# Patient Record
Sex: MALE | Race: WHITE | NOT HISPANIC OR LATINO | URBAN - METROPOLITAN AREA
[De-identification: names, ages, dates, MRNs, and addresses within clinical notes are randomized per-mention and may not be internally consistent; named-entity substitution may affect disease eponyms.]

---

## 2019-05-09 ENCOUNTER — EMERGENCY (EMERGENCY)
Facility: HOSPITAL | Age: 64
LOS: 1 days | Discharge: HOME | End: 2019-05-09
Attending: EMERGENCY MEDICINE | Admitting: EMERGENCY MEDICINE
Payer: COMMERCIAL

## 2019-05-09 VITALS
OXYGEN SATURATION: 96 % | HEIGHT: 73 IN | TEMPERATURE: 98 F | HEART RATE: 84 BPM | WEIGHT: 184.97 LBS | SYSTOLIC BLOOD PRESSURE: 119 MMHG | DIASTOLIC BLOOD PRESSURE: 76 MMHG | RESPIRATION RATE: 16 BRPM

## 2019-05-09 VITALS
DIASTOLIC BLOOD PRESSURE: 86 MMHG | RESPIRATION RATE: 18 BRPM | HEART RATE: 99 BPM | TEMPERATURE: 98 F | SYSTOLIC BLOOD PRESSURE: 156 MMHG | OXYGEN SATURATION: 95 %

## 2019-05-09 DIAGNOSIS — Z98.89 OTHER SPECIFIED POSTPROCEDURAL STATES: Chronic | ICD-10-CM

## 2019-05-09 DIAGNOSIS — F41.1 GENERALIZED ANXIETY DISORDER: ICD-10-CM

## 2019-05-09 LAB
ALBUMIN SERPL ELPH-MCNC: 3.7 G/DL — SIGNIFICANT CHANGE UP (ref 3.3–5)
ALP SERPL-CCNC: 110 U/L — SIGNIFICANT CHANGE UP (ref 30–120)
ALT FLD-CCNC: 48 U/L DA — SIGNIFICANT CHANGE UP (ref 10–60)
AMPHET UR-MCNC: NEGATIVE — SIGNIFICANT CHANGE UP
ANION GAP SERPL CALC-SCNC: 13 MMOL/L — SIGNIFICANT CHANGE UP (ref 5–17)
APAP SERPL-MCNC: <1 UG/ML — LOW (ref 10–30)
APPEARANCE UR: CLEAR — SIGNIFICANT CHANGE UP
AST SERPL-CCNC: 54 U/L — HIGH (ref 10–40)
BARBITURATES UR SCN-MCNC: NEGATIVE — SIGNIFICANT CHANGE UP
BASOPHILS # BLD AUTO: 0.06 K/UL — SIGNIFICANT CHANGE UP (ref 0–0.2)
BASOPHILS NFR BLD AUTO: 0.6 % — SIGNIFICANT CHANGE UP (ref 0–2)
BENZODIAZ UR-MCNC: POSITIVE
BILIRUB DIRECT SERPL-MCNC: 0.1 MG/DL — SIGNIFICANT CHANGE UP (ref 0–0.2)
BILIRUB INDIRECT FLD-MCNC: 0.4 MG/DL — SIGNIFICANT CHANGE UP (ref 0.2–1)
BILIRUB SERPL-MCNC: 0.5 MG/DL — SIGNIFICANT CHANGE UP (ref 0.2–1.2)
BILIRUB UR-MCNC: NEGATIVE — SIGNIFICANT CHANGE UP
BUN SERPL-MCNC: 14 MG/DL — SIGNIFICANT CHANGE UP (ref 7–23)
CALCIUM SERPL-MCNC: 8.3 MG/DL — LOW (ref 8.4–10.5)
CHLORIDE SERPL-SCNC: 105 MMOL/L — SIGNIFICANT CHANGE UP (ref 96–108)
CO2 SERPL-SCNC: 25 MMOL/L — SIGNIFICANT CHANGE UP (ref 22–31)
COCAINE METAB.OTHER UR-MCNC: POSITIVE
COLOR SPEC: YELLOW — SIGNIFICANT CHANGE UP
CREAT SERPL-MCNC: 0.96 MG/DL — SIGNIFICANT CHANGE UP (ref 0.5–1.3)
DIFF PNL FLD: NEGATIVE — SIGNIFICANT CHANGE UP
EOSINOPHIL # BLD AUTO: 0.17 K/UL — SIGNIFICANT CHANGE UP (ref 0–0.5)
EOSINOPHIL NFR BLD AUTO: 1.7 % — SIGNIFICANT CHANGE UP (ref 0–6)
ETHANOL SERPL-MCNC: 240 MG/DL — HIGH (ref 0–3)
ETHANOL SERPL-MCNC: 96 MG/DL — HIGH (ref 0–3)
GLUCOSE SERPL-MCNC: 66 MG/DL — LOW (ref 70–99)
GLUCOSE UR QL: NEGATIVE MG/DL — SIGNIFICANT CHANGE UP
HCT VFR BLD CALC: 46.6 % — SIGNIFICANT CHANGE UP (ref 39–50)
HGB BLD-MCNC: 16.8 G/DL — SIGNIFICANT CHANGE UP (ref 13–17)
IMM GRANULOCYTES NFR BLD AUTO: 0.4 % — SIGNIFICANT CHANGE UP (ref 0–1.5)
KETONES UR-MCNC: NEGATIVE — SIGNIFICANT CHANGE UP
LEUKOCYTE ESTERASE UR-ACNC: NEGATIVE — SIGNIFICANT CHANGE UP
LYMPHOCYTES # BLD AUTO: 3.39 K/UL — HIGH (ref 1–3.3)
LYMPHOCYTES # BLD AUTO: 34.9 % — SIGNIFICANT CHANGE UP (ref 13–44)
MCHC RBC-ENTMCNC: 31.3 PG — SIGNIFICANT CHANGE UP (ref 27–34)
MCHC RBC-ENTMCNC: 36.1 GM/DL — HIGH (ref 32–36)
MCV RBC AUTO: 86.9 FL — SIGNIFICANT CHANGE UP (ref 80–100)
METHADONE UR-MCNC: NEGATIVE — SIGNIFICANT CHANGE UP
MONOCYTES # BLD AUTO: 0.56 K/UL — SIGNIFICANT CHANGE UP (ref 0–0.9)
MONOCYTES NFR BLD AUTO: 5.8 % — SIGNIFICANT CHANGE UP (ref 2–14)
NEUTROPHILS # BLD AUTO: 5.5 K/UL — SIGNIFICANT CHANGE UP (ref 1.8–7.4)
NEUTROPHILS NFR BLD AUTO: 56.6 % — SIGNIFICANT CHANGE UP (ref 43–77)
NITRITE UR-MCNC: NEGATIVE — SIGNIFICANT CHANGE UP
NRBC # BLD: 0 /100 WBCS — SIGNIFICANT CHANGE UP (ref 0–0)
OPIATES UR-MCNC: NEGATIVE — SIGNIFICANT CHANGE UP
PCP SPEC-MCNC: SIGNIFICANT CHANGE UP
PCP UR-MCNC: NEGATIVE — SIGNIFICANT CHANGE UP
PH UR: 5 — SIGNIFICANT CHANGE UP (ref 5–8)
PLATELET # BLD AUTO: 329 K/UL — SIGNIFICANT CHANGE UP (ref 150–400)
POTASSIUM SERPL-MCNC: 3.8 MMOL/L — SIGNIFICANT CHANGE UP (ref 3.5–5.3)
POTASSIUM SERPL-SCNC: 3.8 MMOL/L — SIGNIFICANT CHANGE UP (ref 3.5–5.3)
PROT SERPL-MCNC: 7.4 G/DL — SIGNIFICANT CHANGE UP (ref 6–8.3)
PROT UR-MCNC: NEGATIVE MG/DL — SIGNIFICANT CHANGE UP
RBC # BLD: 5.36 M/UL — SIGNIFICANT CHANGE UP (ref 4.2–5.8)
RBC # FLD: 11.9 % — SIGNIFICANT CHANGE UP (ref 10.3–14.5)
SALICYLATES SERPL-MCNC: 1.1 MG/DL — LOW (ref 2.8–20)
SODIUM SERPL-SCNC: 143 MMOL/L — SIGNIFICANT CHANGE UP (ref 135–145)
SP GR SPEC: 1.01 — SIGNIFICANT CHANGE UP (ref 1.01–1.02)
THC UR QL: NEGATIVE — SIGNIFICANT CHANGE UP
UROBILINOGEN FLD QL: NEGATIVE MG/DL — SIGNIFICANT CHANGE UP
WBC # BLD: 9.72 K/UL — SIGNIFICANT CHANGE UP (ref 3.8–10.5)
WBC # FLD AUTO: 9.72 K/UL — SIGNIFICANT CHANGE UP (ref 3.8–10.5)

## 2019-05-09 PROCEDURE — 99285 EMERGENCY DEPT VISIT HI MDM: CPT | Mod: 25

## 2019-05-09 PROCEDURE — 90792 PSYCH DIAG EVAL W/MED SRVCS: CPT | Mod: GT

## 2019-05-09 PROCEDURE — 85027 COMPLETE CBC AUTOMATED: CPT

## 2019-05-09 PROCEDURE — 80053 COMPREHEN METABOLIC PANEL: CPT

## 2019-05-09 PROCEDURE — 93010 ELECTROCARDIOGRAM REPORT: CPT

## 2019-05-09 PROCEDURE — 36415 COLL VENOUS BLD VENIPUNCTURE: CPT

## 2019-05-09 PROCEDURE — 99285 EMERGENCY DEPT VISIT HI MDM: CPT

## 2019-05-09 PROCEDURE — 82248 BILIRUBIN DIRECT: CPT

## 2019-05-09 PROCEDURE — 93005 ELECTROCARDIOGRAM TRACING: CPT

## 2019-05-09 PROCEDURE — 81003 URINALYSIS AUTO W/O SCOPE: CPT

## 2019-05-09 PROCEDURE — 80307 DRUG TEST PRSMV CHEM ANLYZR: CPT

## 2019-05-09 NOTE — ED PROVIDER NOTE - OBJECTIVE STATEMENT
65 y/o male with a PMHx of depression, EtOH and drug abuse, anxiety, HTN, suicidal behavior presents to the ED for intoxication and SI. Pt states he took a lot of xanax, clonazepam, vodka because his daughter is sick. Denies SI. Per wife, pt has been taking xanax and clonazepam. Has been to [place] in [place] for anxiety. No prior hospitalization for SI.

## 2019-05-09 NOTE — ED BEHAVIORAL HEALTH ASSESSMENT NOTE - DESCRIPTION (FIRST USE, LAST USE, QUANTITY, FREQUENCY, DURATION)
has occasional binges, up to several days at a time, when anxiety is bad; prior to today, last episode ~sivakumar used 2 weeks ago at a wedding hx of overusing his benzodiazepines (Klonopin, Xanax)

## 2019-05-09 NOTE — ED BEHAVIORAL HEALTH ASSESSMENT NOTE - OTHER PAST PSYCHIATRIC HISTORY (INCLUDE DETAILS REGARDING ONSET, COURSE OF ILLNESS, INPATIENT/OUTPATIENT TREATMENT)
currently seeing psychiatrist in [place] ("Dr. CALVERT") at [facility] ([phone]) who he sees once a month; hx of multiple psychiatric hospitalizations ([facility] - 2008, [facility], [facility] x 2, [facility] x 2, [facility]), most recently ~1 1/2 yrs ago at [facility]; hospitalizations for overdoses related to social anxiety/panic attacks; says he has never been medically hospitalized for these overdoses; no self-harming behaviors

## 2019-05-09 NOTE — ED BEHAVIORAL HEALTH ASSESSMENT NOTE - SUICIDE PROTECTIVE FACTORS
Identifies reasons for living/Future oriented/Engaged in work or school/Positive therapeutic relationships/Supportive social network or family/Responsibility to family and others

## 2019-05-09 NOTE — ED BEHAVIORAL HEALTH ASSESSMENT NOTE - RISK ASSESSMENT
Patient currently has a moderate to high chronic risk of harm to self.  His chronic risk factors include history of self-harm, suicide attempt (somewhat abated by fact that he was intoxicated and denies clear intent of ending life in the past), psychiatric hospitalization, psychiatric treatment, substance use, male gender, financial struggles, social isolation, and chronic medical diagnoses.. His potential acute risk factors include substance abuse and anxiety.  His protective factors include strong family support, current willingness to engage in treatment, participation in safety planning, future orientation, and current denial of any thoughts of self-harm and/or suicide.

## 2019-05-09 NOTE — ED ADULT NURSE NOTE - ED STAT RN HANDOFF DETAILS
Patient on constant observation. speaking with tele psych for second time at this time. IV SL patent. Pending disposition. Cooperative. endorsed to Estrellita Capellan RN.

## 2019-05-09 NOTE — ED BEHAVIORAL HEALTH ASSESSMENT NOTE - HPI (INCLUDE ILLNESS QUALITY, SEVERITY, DURATION, TIMING, CONTEXT, MODIFYING FACTORS, ASSOCIATED SIGNS AND SYMPTOMS)
Patient is a ____ year-old ____ male/female, currently living in ____ with his/her _____, enrolled in ____ school, in the ____ grade regular/special education, with prior psychiatric history of insert mentioned diagnoses, currently in/not in outpatient treatment, with/without history of insert number of psychiatric hospitalization(s), with/without history of self-injury or suicide attempts, with past medical history of ____, with/without history of aggression, violence or legal troubles, now presenting accompanied by ¬¬¬____ due to ____.    As per patient, he says that yesterday morning, his daughter was repeatedly texting them that she was not feeling well. He describes himself as a control freak, and he began to freak out, experiencing an intense panic attack, characterized by palpitations, racing thoughts, and feeling like he is in another place, "like I am going to go crazy."  He also finds that the morning is a tougher thing as he thinks about his day ahead of him. He took his Klonopin 1 mg and tried to lay down, but he was not able to calm down. He proceeded to drive to the liquor store, thinking he will have a drink and feel better, but he purchased a pint of vodka. He went home, drank the vodka, and his son saw that he was intoxicated.  He got in the car and drove down the block, falling asleep in the car. His daughter was able to see that he had driven down the street, and his brother came to the home. He knows that he has a history of taking additional Klonopin once he is drunk.    His wife then brought him to the hospital. He says that this was because they wanted him to stop using substances.  Says that he and his wife decided that he was going to go back to [place] for Dual Diagnosis Treatment.  He says that aside from this episode, he has been struggling, finding that he has good and bad days, but he is always in a state of tension. He notes that he is better now than he used to, describing how he always had a fear of something bad happening to others or to himself. This would cause him to be unable to be in social situations or public places of any kind.  He describes his social anxiety relates to a challenge with small talk, feeling like he wont want to have ot think of what to say. He also has a history of panic attacks, but says that since taking the Klonopin, it has been "hardly ever." However, if something happens, he will get set off. Now, however, they are not spontaneous. Says that his mood has been generally okay, although he has been stressed about finances, noting that they are maxed out on credit cards, his son is leaving for college in September, supporting his daughter's life at college, having four cars, and owing $12,000 to the [employer] for their taxes.  He says that at times, he has thoughts like he would love to "end it all," although he denies ever having an active intent or plan to harm himself. Says that the suicidal comments he made are "just to annoy Liane," denying any thoughts of harming himself or killing himself in any way at present. He says that inpatient treatment had helped consolidate Patient is a ____ year-old ____ male/female, currently living in ____ with his/her _____, enrolled in ____ school, in the ____ grade regular/special education, with prior psychiatric history of insert mentioned diagnoses, currently in/not in outpatient treatment, with/without history of insert number of psychiatric hospitalization(s), with/without history of self-injury or suicide attempts, with past medical history of ____, with/without history of aggression, violence or legal troubles, now presenting accompanied by ¬¬¬____ due to ____.    As per patient, he says that yesterday morning, his daughter was repeatedly texting them that she was not feeling well. He describes himself as a control freak, and he began to freak out, experiencing an intense panic attack, characterized by palpitations, racing thoughts, and feeling like he is in another place, "like I am going to go crazy."  He also finds that the morning is a tougher thing as he thinks about his day ahead of him. He took his Klonopin 1 mg and tried to lay down, but he was not able to calm down. He proceeded to drive to the liquor store, thinking he will have a drink and feel better, but he purchased a pint of vodka. He went home, drank the vodka, and his son saw that he was intoxicated.  He got in the car and drove down the block, falling asleep in the car. His daughter was able to see that he had driven down the street, and his brother came to the home. He knows that he has a history of taking additional Klonopin once he is drunk.    His wife then brought him to the hospital. He says that this was because they wanted him to stop using substances.  Says that he and his wife decided that he was going to go back to [place] for Dual Diagnosis Treatment.  He says that aside from this episode, he has been struggling, finding that he has good and bad days, but he is always in a state of tension. He notes that he is better now than he used to, describing how he always had a fear of something bad happening to others or to himself. This would cause him to be unable to be in social situations or public places of any kind.  He describes his social anxiety relates to a challenge with small talk, feeling like he wont want to have ot think of what to say. He also has a history of panic attacks, but says that since taking the Klonopin, it has been "hardly ever." However, if something happens, he will get set off. Now, however, they are not spontaneous. Says that his mood has been generally okay, although he has been stressed about finances, noting that they are maxed out on credit cards, his son is leaving for college in September, supporting his daughter's life at college, having four cars, and owing $12,000 to the [employer] for their taxes.  He says that at times, he has thoughts like he would love to "end it all," although he denies ever having an active intent or plan to harm himself. Says that the suicidal comments he made are "just to annoy Liane," denying any thoughts of harming himself or killing himself in any way at present. He says that inpatient treatment had helped consolidate his skills, but he does not find that inpatient has been good enough as he has to leave within 1 week and also that the outpatient programs have not met his expectations. Patient is a  year-old ____ male/female, currently living in ____ with his/her _____, enrolled in ____ school, in the ____ grade regular/special education, with prior psychiatric history of insert mentioned diagnoses, currently in/not in outpatient treatment, with/without history of insert number of psychiatric hospitalization(s), with/without history of self-injury or suicide attempts, with past medical history of ____, with/without history of aggression, violence or legal troubles, now presenting accompanied by ¬¬¬____ due to ____.    As per patient, he says that yesterday morning, his daughter was repeatedly texting them that she was not feeling well. He describes himself as a control freak, and he began to freak out, experiencing an intense panic attack, characterized by palpitations, racing thoughts, and feeling like he is in another place, "like I am going to go crazy."  He also finds that the morning is a tougher thing as he thinks about his day ahead of him. He took his Klonopin 1 mg and tried to lay down, but he was not able to calm down. He proceeded to drive to the liquor store, thinking he will have a drink and feel better, but he purchased a pint of vodka. He went home, drank the vodka, and his son saw that he was intoxicated.  He got in the car and drove down the block, falling asleep in the car. His daughter was able to see that he had driven down the street, and his brother came to the home. He knows that he has a history of taking additional Klonopin once he is drunk.    His wife then brought him to the hospital. He says that this was because they wanted him to stop using substances.  Says that he and his wife decided that he was going to go back to [place] for Dual Diagnosis Treatment.  He says that aside from this episode, he has been struggling, finding that he has good and bad days, but he is always in a state of tension. He notes that he is better now than he used to, describing how he always had a fear of something bad happening to others or to himself. This would cause him to be unable to be in social situations or public places of any kind.  He describes his social anxiety relates to a challenge with small talk, feeling like he wont want to have ot think of what to say. He also has a history of panic attacks, but says that since taking the Klonopin, it has been "hardly ever." However, if something happens, he will get set off. Now, however, they are not spontaneous. Says that his mood has been generally okay, although he has been stressed about finances, noting that they are maxed out on credit cards, his son is leaving for college in September, supporting his daughter's life at college, having four cars, and owing $12,000 to the [employer] for their taxes.  He says that at times, he has thoughts like he would love to "end it all," although he denies ever having an active intent or plan to harm himself. Says that the suicidal comments he made are "just to annoy Liane," denying any thoughts of harming himself or killing himself in any way at present. He says that inpatient treatment had helped consolidate his skills, but he does not find that inpatient has been good enough as he has to leave within 1 week and also that the outpatient programs have not met his expectations. Patient is a 64 year-old  male, currently living in [place] with his wife and son, employed at a [employer] factory, with prior psychiatric history of Generalized Anxiety Disorder, Social Phobia and Panic Disorder, currently in outpatient psychiatric treatment at [facility] in [place], with history of multiple psychiatric hospitalizations, most recently 2 yrs ago, with treatment primarily for dual diagnosis given his history of significant ETOH Use Disorder and Benzodiazepine Use Disorder, without history of self-injury but with multiple prior overdoses which have included questionable suicidality as patient has made multiple suicidal statements over time, on one occasion slitting his wrist while intoxicated, with past medical history of HTN, HLD, and GERD, without history of aggression, violence or legal troubles, now presenting accompanied by his wife after an overdose on Klonopin, Xanax and ETOH in the context of worsening anxiety. However, they say that they are primarily here for "medical clearance for [facility] or [facility]."     As per patient, he says that yesterday morning, his daughter was repeatedly texting them that she was not feeling well. He describes himself as a control freak, and he began to freak out, experiencing an intense panic attack, characterized by palpitations, racing thoughts, and feeling like he is in another place, "like I am going to go crazy."  He also finds that the morning is a tougher thing as he thinks about his day ahead of him. He took his Klonopin 1 mg and tried to lay down, but he was not able to calm down. He proceeded to drive to the liquor store, thinking he will have a drink and feel better, but he purchased a pint of vodka. He went home, drank the vodka, and his son saw that he was intoxicated.  He got in the car and drove down the block, falling asleep in the car. His daughter was able to see that he had driven down the street, and his brother came to the home. He knows that he has a history of taking additional Klonopin once he is drunk.    His wife then brought him to the hospital. He says that this was because they wanted him to stop using substances.  Says that he and his wife decided that he was going to go back to [place] for Dual Diagnosis Treatment.  He says that aside from this episode, he has been struggling, finding that he has good and bad days, but he is always in a state of tension. He notes that he is better now than he used to, describing how he always had a fear of something bad happening to others or to himself. This would cause him to be unable to be in social situations or public places of any kind.  He describes his social anxiety relates to a challenge with small talk, feeling like he wont want to have ot think of what to say. He also has a history of panic attacks, but says that since taking the Klonopin, it has been "hardly ever." However, if something happens, he will get set off. Now, however, they are not spontaneous. Says that his mood has been generally okay, although he has been stressed about finances, noting that they are maxed out on credit cards, his son is leaving for college in September, supporting his daughter's life at college, having four cars, and owing $12,000 to the [employer] for their taxes.  He says that at times, he has thoughts like he would love to "end it all," although he denies ever having an active intent or plan to harm himself. Says that the suicidal comments he made are "just to annoy Liane," denying any thoughts of harming himself or killing himself in any way at present. He says that inpatient treatment had helped consolidate his skills, but he does not find that inpatient has been good enough as he has to leave within 1 week and also that the outpatient programs have not met his expectations. Patient is a 64 year-old  male, currently living in [place] with his wife and son, employed at a [email or web address] factory, with prior psychiatric history of Generalized Anxiety Disorder, Social Phobia and Panic Disorder, currently in outpatient psychiatric treatment at [facility] in [place], with history of multiple psychiatric hospitalizations, most recently 2 yrs ago, with treatment primarily for dual diagnosis given his history of significant ETOH Use Disorder and Benzodiazepine Use Disorder, without history of self-injury but with multiple prior overdoses which have included questionable suicidality as patient has made multiple suicidal statements over time, on one occasion slitting his wrist while intoxicated, with past medical history of HTN, HLD, and GERD, without history of aggression, violence or legal troubles, now presenting accompanied by his wife after an overdose on Klonopin, Xanax and ETOH in the context of worsening anxiety. However, they say that they are primarily here for "medical clearance for [facility] or [facility]."     As per patient, he says that yesterday morning, his daughter was repeatedly texting them that she was not feeling well. He describes himself as a control freak, and he began to freak out, experiencing an intense panic attack, characterized by palpitations, racing thoughts, and feeling like he is in another place, "like I am going to go crazy."  He also finds that the morning is a tougher thing as he thinks about his day ahead of him. He took his Klonopin 1 mg and tried to lay down, but he was not able to calm down. He proceeded to drive to the liquor store, thinking he will have a drink and feel better, but he purchased a pint of vodka. He went home, drank the vodka, and his son saw that he was intoxicated.  He got in the car and drove down the block, falling asleep in the car. His daughter was able to see that he had driven down the street, and his brother came to the home. He knows that he has a history of taking additional Klonopin once he is drunk.    His wife then brought him to the hospital. He says that this was because they wanted him to stop using substances.  Says that he and his wife decided that he was going to go back to [place] for Dual Diagnosis Treatment.  He says that aside from this episode, he has been struggling, finding that he has good and bad days, but he is always in a state of tension. He notes that he is better now than he used to, describing how he always had a fear of something bad happening to others or to himself. This would cause him to be unable to be in social situations or public places of any kind.  He describes his social anxiety relates to a challenge with small talk, feeling like he wont want to have ot think of what to say. He also has a history of panic attacks, but says that since taking the Klonopin, it has been "hardly ever." However, if something happens, he will get set off. Now, however, they are not spontaneous. Says that his mood has been generally okay, although he has been stressed about finances, noting that they are maxed out on credit cards, his son is leaving for college in September, supporting his daughter's life at college, having four cars, and owing $12,000 to the [employer] for their taxes.  He says that at times, he has thoughts like he would love to "end it all," although he denies ever having an active intent or plan to harm himself. Says that the suicidal comments he made are "just to annoy Liane," denying any thoughts of harming himself or killing himself in any way at present. He says that inpatient treatment had helped consolidate his skills, but he does not find that inpatient has been good enough as he has to leave within 1 week and also that the outpatient programs have not met his expectations.    SEE ADDENDUM FOR COLLATERAL

## 2019-05-09 NOTE — ED PROVIDER NOTE - NS_ ATTENDINGSCRIBEDETAILS _ED_A_ED_FT
Kaiden Henry MD - The scribe's documentation has been prepared under my direction and personally reviewed by me in its entirety. I confirm that the note above accurately reflects all work, treatment, procedures, and medical decision making performed by me.

## 2019-05-09 NOTE — ED ADULT NURSE NOTE - NS ED NURSE DC INFO COMPLEXITY
Verbalized Understanding/Complex: Multiple Rx/Tx. Pt has difficulty understanding. Requires additional help/Patient asked questions

## 2019-05-09 NOTE — ED BEHAVIORAL HEALTH ASSESSMENT NOTE - REFERRAL / APPOINTMENT DETAILS
patient is going to attend an inpatient dual diagnosis program, which his family will bring him to tomorrow; may also continue to work with outpatient team if that is what he chooses; discussed other options, which they declined

## 2019-05-09 NOTE — ED ADULT TRIAGE NOTE - CHIEF COMPLAINT QUOTE
Patient presents to ED obtunded, lethargic and responding to stimuli. Patient wife gave information, patient has been taking xanax and clonazepam in high quantty over the past 48hrs and drinking ETOH. patient reports SI with intent. Placed on constant observation. PMH anxiety and depression.

## 2019-05-09 NOTE — ED PROVIDER NOTE - PROGRESS NOTE DETAILS
As per wife, pt lied and had suicidal ideation. pt and wife aware , will wait for etoh level to decrease <100 prior to Telepsych eval about pt and wife aware , will wait for etoh level to decrease <100 prior to Telepsych eval about 2100 As per wife, pt lied and had suicidal ideations. pt admitted to "I don't want live like this," and suicidal ideations expressed multiple times to 1:1 aide, while pt's wife Liane was present.  Both patient and wife upset bc they want to go to [place] in [place] seen by telepsych, to f/u with detox program tomorrow with family, cleared

## 2019-05-09 NOTE — ED ADULT NURSE NOTE - OBJECTIVE STATEMENT
Patient brought in for medical clearance for admission to [facility] &/or [facility]. Patient took an unknown amount of klonopin and xanax and drank vodka today in an attempt to harm himself. Patient presents drowsy, arouses easily and is irritable. Patient states he learned his daughter is sick with a sore throat away at college so he became anxious and was trying to harm himself.

## 2019-05-09 NOTE — ED BEHAVIORAL HEALTH ASSESSMENT NOTE - CURRENT MEDICATION
hx of Generalized Anxiety Disorder, Panic Disorder, since 14 year old; Klonopin 0.5 mg (prescribed twice daily but takes 1 mg in AM), Xanax 0.5 mg as needed for anxiety/panic (says he takes his supply of 90 pills/60 days), Norvasc 10 mg daily, Nexium, Atorvastatin, Propecia (for hair growth)

## 2019-05-09 NOTE — ED BEHAVIORAL HEALTH ASSESSMENT NOTE - DETAILS
intoxicated paternal aunt - agoraphobia both parents - dementia 2 brothers - heroin addicts, now sober, sister - oxycontin, sister - ETOH when he first went to [place] - wife was away and daughter was at home 1 in college, 1 in high school see HPI wife

## 2019-05-09 NOTE — ED ADULT NURSE NOTE - HPI (INCLUDE ILLNESS QUALITY, SEVERITY, DURATION, TIMING, CONTEXT, MODIFYING FACTORS, ASSOCIATED SIGNS AND SYMPTOMS)
as previously stated the patient learned his daughter who is away at college was ill with a sore throat. He became anxious and took an unknown amount of klonopin and xanax and drank vodka. Family found patient drowsy, difficult to arouse and tried to get him admitted to either [facility] or [facility] where he has been admitted in the past but he needs medical clearance so he was brought here.

## 2019-05-09 NOTE — ED ADULT NURSE NOTE - NSIMPLEMENTINTERV_GEN_ALL_ED
Implemented All Fall Risk Interventions:  [place] to call system. Call bell, personal items and telephone within reach. Instruct patient to call for assistance. Room bathroom lighting operational. Non-slip footwear when patient is off stretcher. Physically safe environment: no spills, clutter or unnecessary equipment. Stretcher in lowest position, wheels locked, appropriate side rails in place. Provide visual cue, wrist band, yellow gown, etc. Monitor gait and stability. Monitor for mental status changes and reorient to person, place, and time. Review medications for side effects contributing to fall risk. Reinforce activity limits and safety measures with patient and family.

## 2019-05-09 NOTE — ED BEHAVIORAL HEALTH ASSESSMENT NOTE - DESCRIPTION
HTN, HLD, GERD enjoys golfing, skiing/snowboarding, socially limited due to anxiety Pt in ED ~5 hours prior to Telepsych consult. Per Triage RN (verbally conveyed to primary RN) and Primary RN: The pt arrived at ~1415 accompanied by his wife, pt was dressed appropriately for the weather and with good hygiene and grooming (some messy hair due to intoxication but otherwise good). At time of arrival, pt was drowsy, obtunded, A/OX3 (knew place, situation, self but couldn’t state date), lethargic but responsive to stimuli. Pt cooperated with the triage process, including gowning and wanding but was slurring his words and only minimally engaging in conversation. Pt’s property was not searched but given to his wife. Pt was placed into a room and assigned a 1:1 staff member due to concern for suicidal ideations. Pt was found to have a BAL of 240 mg/dL at 1447. Pt was showing SPO2 in the 95-96 range so was placed on O2 via NC until about 1700 (unclear how many liters). Pt was sleeping for several hours and was later reassessed for sobriety around 8-9pm. At that time, pt was no longer appearing intoxicated, noted have a linear and logical thought process, normal volume and rate of speech without slurring of his words. Pt was not observed to be internally preoccupied and did not voice any delusions. Pt reported feeling ok and did not appear grossly anxious or depressed. Pt has not been agitated or aggressive since arrival. No medications, restraints or security interventions were required. Pt has been engaging with the 1:1 and other staff appropriately, however, RN notes that when pt’s wife was in the room he was acting oppositional and appeared to be manipulating her since whenever she would leave he would stop the behavior. Wife was often speaking for the  and stating he would lie about things but when she left the room pt was able to communicate without issue and appeared to be truthful. Pt calmly awaiting telepsychiatry interview.

## 2019-05-09 NOTE — ED BEHAVIORAL HEALTH ASSESSMENT NOTE - SUMMARY
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, with prior psychiatric history of Generalized Anxiety Disorder, Social Phobia and Panic Disorder, currently in outpatient psychiatric treatment at [facility] in [place], with history of multiple psychiatric hospitalizations, most recently 2 yrs ago, with treatment primarily for dual diagnosis given his history of significant ETOH Use Disorder and Benzodiazepine Use Disorder, without history of self-injury but with multiple prior overdoses which have included questionable suicidality as patient has made multiple suicidal statements over time, on one occasion slitting his wrist while intoxicated, with past medical history of HTN, HLD, and GERD, without history of aggression, violence or legal troubles, now presenting accompanied by his wife after an overdose on Klonopin, Xanax and ETOH in the context of worsening anxiety. However, they say that they are primarily here for "medical clearance for [place] or [facility]." As per patient, he has a chronic struggle with anxiety which he has periodically addressed with impulsive ingestion of alcohol and benzodiazepines.  He says that these are periodic, but that recently, within the context of increased stress, he has been struggling and says that his passive suicidality has been consistent. He feels that, although he is engaged in outpatient treatment with his psychiatrist, he needs a more intensive program. He denies any suicidal thoughts of active quality, recently or at present, saying he made those comments to "piss off my wife." His wife, additionally, is comfortable with patient returning home, with plan to attend an intensive inpatient dual diagnosis program, likely at [facility]. He does not meet criteria for involuntary psychiatric hospitalization and declines voluntary admission. Safe for d/c ot outpatient level of care at this time.

## 2019-05-09 NOTE — ED BEHAVIORAL HEALTH ASSESSMENT NOTE - DIFFERENTIAL
Generalized Anxiety Disorder  EtOH Use Disorder  Benzodiazepine Use Disorder  Panic Disorder  Social Phobia

## 2019-05-09 NOTE — ED PROVIDER NOTE - CARE PLAN
Principal Discharge DX:	Polysubstance abuse  Secondary Diagnosis:	Depression, unspecified depression type

## 2020-07-26 ENCOUNTER — EMERGENCY (EMERGENCY)
Facility: HOSPITAL | Age: 65
LOS: 1 days | Discharge: PSYCHIATRIC OR SUBSTANCE USE FACILITY | End: 2020-07-26
Attending: EMERGENCY MEDICINE | Admitting: EMERGENCY MEDICINE
Payer: COMMERCIAL

## 2020-07-26 VITALS
HEIGHT: 72 IN | HEART RATE: 98 BPM | OXYGEN SATURATION: 94 % | RESPIRATION RATE: 18 BRPM | DIASTOLIC BLOOD PRESSURE: 89 MMHG | WEIGHT: 195.11 LBS | TEMPERATURE: 99 F | SYSTOLIC BLOOD PRESSURE: 131 MMHG

## 2020-07-26 DIAGNOSIS — Z98.89 OTHER SPECIFIED POSTPROCEDURAL STATES: Chronic | ICD-10-CM

## 2020-07-26 DIAGNOSIS — F33.2 MAJOR DEPRESSIVE DISORDER, RECURRENT SEVERE WITHOUT PSYCHOTIC FEATURES: ICD-10-CM

## 2020-07-26 LAB
ALBUMIN SERPL ELPH-MCNC: 3.8 G/DL — SIGNIFICANT CHANGE UP (ref 3.3–5)
ALP SERPL-CCNC: 87 U/L — SIGNIFICANT CHANGE UP (ref 30–120)
ALT FLD-CCNC: 39 U/L DA — SIGNIFICANT CHANGE UP (ref 10–60)
ANION GAP SERPL CALC-SCNC: 14 MMOL/L — SIGNIFICANT CHANGE UP (ref 5–17)
APAP SERPL-MCNC: <1 — SIGNIFICANT CHANGE UP (ref 10–30)
APPEARANCE UR: CLEAR — SIGNIFICANT CHANGE UP
AST SERPL-CCNC: 42 U/L — HIGH (ref 10–40)
BASOPHILS # BLD AUTO: 0.07 K/UL — SIGNIFICANT CHANGE UP (ref 0–0.2)
BASOPHILS NFR BLD AUTO: 0.7 % — SIGNIFICANT CHANGE UP (ref 0–2)
BILIRUB DIRECT SERPL-MCNC: 0.1 MG/DL — SIGNIFICANT CHANGE UP (ref 0–0.2)
BILIRUB INDIRECT FLD-MCNC: 0.5 MG/DL — SIGNIFICANT CHANGE UP (ref 0.2–1)
BILIRUB SERPL-MCNC: 0.6 MG/DL — SIGNIFICANT CHANGE UP (ref 0.2–1.2)
BILIRUB UR-MCNC: NEGATIVE — SIGNIFICANT CHANGE UP
BUN SERPL-MCNC: 13 MG/DL — SIGNIFICANT CHANGE UP (ref 7–23)
CALCIUM SERPL-MCNC: 9 MG/DL — SIGNIFICANT CHANGE UP (ref 8.4–10.5)
CHLORIDE SERPL-SCNC: 103 MMOL/L — SIGNIFICANT CHANGE UP (ref 96–108)
CO2 SERPL-SCNC: 25 MMOL/L — SIGNIFICANT CHANGE UP (ref 22–31)
COLOR SPEC: YELLOW — SIGNIFICANT CHANGE UP
CREAT SERPL-MCNC: 0.92 MG/DL — SIGNIFICANT CHANGE UP (ref 0.5–1.3)
DIFF PNL FLD: NEGATIVE — SIGNIFICANT CHANGE UP
EOSINOPHIL # BLD AUTO: 0.23 K/UL — SIGNIFICANT CHANGE UP (ref 0–0.5)
EOSINOPHIL NFR BLD AUTO: 2.1 % — SIGNIFICANT CHANGE UP (ref 0–6)
ETHANOL SERPL-MCNC: 57 MG/DL — HIGH (ref 0–3)
GLUCOSE SERPL-MCNC: 110 MG/DL — HIGH (ref 70–99)
GLUCOSE UR QL: NEGATIVE MG/DL — SIGNIFICANT CHANGE UP
HCT VFR BLD CALC: 46.2 % — SIGNIFICANT CHANGE UP (ref 39–50)
HGB BLD-MCNC: 16.3 G/DL — SIGNIFICANT CHANGE UP (ref 13–17)
IMM GRANULOCYTES NFR BLD AUTO: 0.5 % — SIGNIFICANT CHANGE UP (ref 0–1.5)
KETONES UR-MCNC: NEGATIVE — SIGNIFICANT CHANGE UP
LEUKOCYTE ESTERASE UR-ACNC: NEGATIVE — SIGNIFICANT CHANGE UP
LYMPHOCYTES # BLD AUTO: 1.73 K/UL — SIGNIFICANT CHANGE UP (ref 1–3.3)
LYMPHOCYTES # BLD AUTO: 16.2 % — SIGNIFICANT CHANGE UP (ref 13–44)
MCHC RBC-ENTMCNC: 30.6 PG — SIGNIFICANT CHANGE UP (ref 27–34)
MCHC RBC-ENTMCNC: 35.3 GM/DL — SIGNIFICANT CHANGE UP (ref 32–36)
MCV RBC AUTO: 86.7 FL — SIGNIFICANT CHANGE UP (ref 80–100)
MONOCYTES # BLD AUTO: 0.89 K/UL — SIGNIFICANT CHANGE UP (ref 0–0.9)
MONOCYTES NFR BLD AUTO: 8.3 % — SIGNIFICANT CHANGE UP (ref 2–14)
NEUTROPHILS # BLD AUTO: 7.74 K/UL — HIGH (ref 1.8–7.4)
NEUTROPHILS NFR BLD AUTO: 72.2 % — SIGNIFICANT CHANGE UP (ref 43–77)
NITRITE UR-MCNC: NEGATIVE — SIGNIFICANT CHANGE UP
NRBC # BLD: 0 /100 WBCS — SIGNIFICANT CHANGE UP (ref 0–0)
PCP SPEC-MCNC: SIGNIFICANT CHANGE UP
PH UR: 6 — SIGNIFICANT CHANGE UP (ref 5–8)
PLATELET # BLD AUTO: 316 K/UL — SIGNIFICANT CHANGE UP (ref 150–400)
POTASSIUM SERPL-MCNC: 3.3 MMOL/L — LOW (ref 3.5–5.3)
POTASSIUM SERPL-SCNC: 3.3 MMOL/L — LOW (ref 3.5–5.3)
PROT SERPL-MCNC: 7.3 G/DL — SIGNIFICANT CHANGE UP (ref 6–8.3)
PROT UR-MCNC: NEGATIVE MG/DL — SIGNIFICANT CHANGE UP
RBC # BLD: 5.33 M/UL — SIGNIFICANT CHANGE UP (ref 4.2–5.8)
RBC # FLD: 11.9 % — SIGNIFICANT CHANGE UP (ref 10.3–14.5)
SALICYLATES SERPL-MCNC: 0.3 MG/DL — LOW (ref 2.8–20)
SARS-COV-2 RNA SPEC QL NAA+PROBE: SIGNIFICANT CHANGE UP
SODIUM SERPL-SCNC: 142 MMOL/L — SIGNIFICANT CHANGE UP (ref 135–145)
SP GR SPEC: 1.01 — SIGNIFICANT CHANGE UP (ref 1.01–1.02)
UROBILINOGEN FLD QL: NEGATIVE MG/DL — SIGNIFICANT CHANGE UP
WBC # BLD: 10.71 K/UL — HIGH (ref 3.8–10.5)
WBC # FLD AUTO: 10.71 K/UL — HIGH (ref 3.8–10.5)

## 2020-07-26 PROCEDURE — 93010 ELECTROCARDIOGRAM REPORT: CPT

## 2020-07-26 PROCEDURE — 99285 EMERGENCY DEPT VISIT HI MDM: CPT

## 2020-07-26 PROCEDURE — 12001 RPR S/N/AX/GEN/TRNK 2.5CM/<: CPT

## 2020-07-26 RX ORDER — ASPIRIN/CALCIUM CARB/MAGNESIUM 324 MG
81 TABLET ORAL ONCE
Refills: 0 | Status: COMPLETED | OUTPATIENT
Start: 2020-07-26 | End: 2020-07-26

## 2020-07-26 RX ORDER — SODIUM CHLORIDE 9 MG/ML
1000 INJECTION INTRAMUSCULAR; INTRAVENOUS; SUBCUTANEOUS ONCE
Refills: 0 | Status: COMPLETED | OUTPATIENT
Start: 2020-07-26 | End: 2020-07-26

## 2020-07-26 RX ORDER — AMLODIPINE BESYLATE 2.5 MG/1
10 TABLET ORAL ONCE
Refills: 0 | Status: COMPLETED | OUTPATIENT
Start: 2020-07-26 | End: 2020-07-26

## 2020-07-26 RX ORDER — CLONAZEPAM 1 MG
0 TABLET ORAL
Qty: 0 | Refills: 0 | DISCHARGE

## 2020-07-26 RX ORDER — TETANUS TOXOID, REDUCED DIPHTHERIA TOXOID AND ACELLULAR PERTUSSIS VACCINE, ADSORBED 5; 2.5; 8; 8; 2.5 [IU]/.5ML; [IU]/.5ML; UG/.5ML; UG/.5ML; UG/.5ML
0.5 SUSPENSION INTRAMUSCULAR ONCE
Refills: 0 | Status: COMPLETED | OUTPATIENT
Start: 2020-07-26 | End: 2020-07-26

## 2020-07-26 RX ORDER — CLONAZEPAM 1 MG
1 TABLET ORAL ONCE
Refills: 0 | Status: DISCONTINUED | OUTPATIENT
Start: 2020-07-26 | End: 2020-07-26

## 2020-07-26 RX ORDER — OMEPRAZOLE 10 MG/1
1 CAPSULE, DELAYED RELEASE ORAL
Qty: 0 | Refills: 0 | DISCHARGE

## 2020-07-26 RX ORDER — CLOPIDOGREL BISULFATE 75 MG/1
75 TABLET, FILM COATED ORAL ONCE
Refills: 0 | Status: COMPLETED | OUTPATIENT
Start: 2020-07-26 | End: 2020-07-26

## 2020-07-26 RX ORDER — PANTOPRAZOLE SODIUM 20 MG/1
40 TABLET, DELAYED RELEASE ORAL ONCE
Refills: 0 | Status: COMPLETED | OUTPATIENT
Start: 2020-07-26 | End: 2020-07-26

## 2020-07-26 RX ORDER — ATORVASTATIN CALCIUM 80 MG/1
20 TABLET, FILM COATED ORAL ONCE
Refills: 0 | Status: COMPLETED | OUTPATIENT
Start: 2020-07-26 | End: 2020-07-26

## 2020-07-26 RX ADMIN — PANTOPRAZOLE SODIUM 40 MILLIGRAM(S): 20 TABLET, DELAYED RELEASE ORAL at 08:57

## 2020-07-26 RX ADMIN — CLOPIDOGREL BISULFATE 75 MILLIGRAM(S): 75 TABLET, FILM COATED ORAL at 13:30

## 2020-07-26 RX ADMIN — ATORVASTATIN CALCIUM 20 MILLIGRAM(S): 80 TABLET, FILM COATED ORAL at 13:30

## 2020-07-26 RX ADMIN — SODIUM CHLORIDE 1000 MILLILITER(S): 9 INJECTION INTRAMUSCULAR; INTRAVENOUS; SUBCUTANEOUS at 05:40

## 2020-07-26 RX ADMIN — Medication 1 MILLIGRAM(S): at 13:02

## 2020-07-26 RX ADMIN — TETANUS TOXOID, REDUCED DIPHTHERIA TOXOID AND ACELLULAR PERTUSSIS VACCINE, ADSORBED 0.5 MILLILITER(S): 5; 2.5; 8; 8; 2.5 SUSPENSION INTRAMUSCULAR at 05:19

## 2020-07-26 RX ADMIN — SODIUM CHLORIDE 1000 MILLILITER(S): 9 INJECTION INTRAMUSCULAR; INTRAVENOUS; SUBCUTANEOUS at 05:17

## 2020-07-26 RX ADMIN — AMLODIPINE BESYLATE 10 MILLIGRAM(S): 2.5 TABLET ORAL at 13:30

## 2020-07-26 RX ADMIN — Medication 81 MILLIGRAM(S): at 13:30

## 2020-07-26 RX ADMIN — Medication 1 MILLIGRAM(S): at 19:45

## 2020-07-26 NOTE — ED PROVIDER NOTE - PROGRESS NOTE DETAILS
pt resting, wakes to voice, waiting for labs, wound care/dressing complete pt feeling some burning in epigastrium, will give protonix iv, easily wakes to voice, will report case to [facility] spoke with tele psych consult placed reeval of wound - is c/d/i, steristrips in place, no erythema, no swelling, no discharge, nurse to redress pt asking for dose of clonopin for anxiety - does not appear in w/d - psych c/s states clonopin 1mg once a day - given at 8pm - d/w on call telepsych attg - recommends hydroxyzine if no w/d, ativan if w/d Ambulated in ER without difficulty. Telepsych arranged bed at [address] involuntary papers filled out. Pulse ox 96 on RA. Ate breakfast and lunch without difficulty. No further abdominal complaints.

## 2020-07-26 NOTE — ED BEHAVIORAL HEALTH ASSESSMENT NOTE - OTHER PAST PSYCHIATRIC HISTORY (INCLUDE DETAILS REGARDING ONSET, COURSE OF ILLNESS, INPATIENT/OUTPATIENT TREATMENT)
Around 18 hospitalizations  At least two prior suicide attempts, last was in october when he drank, took pills, [place]  States that he has tried lexapro, effexor, prozac, imipramine, "any that you could name"

## 2020-07-26 NOTE — ED BEHAVIORAL HEALTH NOTE - BEHAVIORAL HEALTH NOTE
Patient reassessed at 8pm. Alert and oriented x 3, in good behavioral control, not internally preoccupied, linear TP, no signs of psychosis on exam. Patient aware of plan for admission when bed becomes available.    A/P  65M with long history of anxiety, multiple suicide attempts and hospitalizations, currently in monthly outpatient med management on klonopin 1mg PO TID PRN, now BIBEMS after patient took a handful of klonopin and cut his wrist with razor and knife in the setting of one week of increased etoh use and stress.     Of primary concern is that patient states that he does not have any idea why he took the pills or cut his wrist, and per medical ED cut was right over the radial artery with significant bleeding per wife. He demonstrates poor insight and judgment as he states he does not know why he cut himself or took the pills, is more focused on talking about his prior stomach pain, and does not think he needs to be hospitalized despite not knowing why he almost killed himself. He would benefit from hospitalization and meets criteria for involuntary admission     - involuntary admission when bed becomes available   - CIWA   - klonopin 1mg PO qDay   - For agitaiton, haldol 5mg with ativan 2mg PO q 6h PRN. Can give IM if severe agitation or refusing PO    - Treatment of medical conditions per medical ED. Per records patient taking norvasc 10 qday for HTN, lipitor 20mg PO qDay for dyslipidemia plavix 75mg per day for stent, aspirin 81mg per day, pantoprozol 40mg per day

## 2020-07-26 NOTE — ED BEHAVIORAL HEALTH ASSESSMENT NOTE - DETAILS
ages 20 and 22 See HPI. Patient denies suicidal intent to this writer, stating that he does not remember, but told medical staff that he was trying to hurt himself Aunt with severe agoraphobia Stomach pain per HPI States that he is "wobbly on his feet" Spoke with spouse Spoke with attending Dr. Reeves

## 2020-07-26 NOTE — ED BEHAVIORAL HEALTH ASSESSMENT NOTE - SUICIDE RISK FACTORS
Anhedonia/Hopelessness or despair/Agitation/Severe Anxiety/Panic/Chronic pain/Access to lethal methods (pills, firearm, etc.: Ask specifically about presence or absence of a firearm in the home or ease of accessing/Alcohol/Substance abuse disorders/Insomnia

## 2020-07-26 NOTE — ED BEHAVIORAL HEALTH ASSESSMENT NOTE - SUMMARY
65M with long history of anxiety, multiple suicide attempts and hospitalizations, currently in monthly outpatient med management on klonopin 1mg PO TID PRN, now BIBEMS after patient took a handful of klonopin and cut his wrist with razor and knife in the setting of one week of increased etoh use and stress.     Of primary concern is that patient states that he does not have any idea why he took the pills or cut his wrist, and per medical ED cut was right over the radial artery 65M with long history of anxiety, multiple suicide attempts and hospitalizations, currently in monthly outpatient med management on klonopin 1mg PO TID PRN, now BIBEMS after patient took a handful of klonopin and cut his wrist with razor and knife in the setting of one week of increased etoh use and stress.     Of primary concern is that patient states that he does not have any idea why he took the pills or cut his wrist, and per medical ED cut was right over the radial artery with significant bleeding per wife. He demonstrates poor insight and judgment as he states he does not know why he cut himself or took the pills, is more focused on talking about his prior stomach pain, and does not think he needs to be hospitalized despite not knowing why he almost killed himself. He would benefit from hospitalization and meets criteria for involuntary admission

## 2020-07-26 NOTE — ED BEHAVIORAL HEALTH ASSESSMENT NOTE - OTHER
Wife Financial stress, marital and familial relationship stress right wrist bandaged did not assess gait. patient able to sit up independently odd that he states he has no idea why he took pills and cut his wrist Family relationships

## 2020-07-26 NOTE — ED PROVIDER NOTE - SKIN, MLM
right wrist 2 parallel vertical lacerations ventral surface radial aspect both approx 5cm long, into subcu tissue with dried/coagulated blood through wounds

## 2020-07-26 NOTE — ED ADULT TRIAGE NOTE - CHIEF COMPLAINT QUOTE
My stomach has been hurting after I overdosed on Klonopin 140mg - I also slashed my right arm in an attempt to harm myself

## 2020-07-26 NOTE — ED BEHAVIORAL HEALTH ASSESSMENT NOTE - DESCRIPTION
Cardiac stent placement, HTN, hyperlipidemia, cervical surgery with plate Lives with wife, children home from college, full time employee as manager working nights Pt in ED ~5 hours prior to Telepsych consult. Per Triage RN (verbally conveyed to primary RN) and Primary RN: The pt arrived at ~1415 accompanied by his wife, pt was dressed appropriately for the weather and with good hygiene and grooming (some messy hair due to intoxication but otherwise good). At time of arrival, pt was drowsy, obtunded, A/OX3 (knew place, situation, self but couldn’t state date), lethargic but responsive to stimuli. Pt cooperated with the triage process, including gowning and wanding but was slurring his words and only minimally engaging in conversation. Pt’s property was not searched but given to his wife. Pt was placed into a room and assigned a 1:1 staff member due to concern for suicidal ideations. Pt was found to have a BAL of 240 mg/dL at 1447. Pt was showing SPO2 in the 95-96 range so was placed on O2 via NC until about 1700 (unclear how many liters). Pt was sleeping for several hours and was later reassessed for sobriety around 8-9pm. At that time, pt was no longer appearing intoxicated, noted have a linear and logical thought process, normal volume and rate of speech without slurring of his words. Pt was not observed to be internally preoccupied and did not voice any delusions. Pt reported feeling ok and did not appear grossly anxious or depressed. Pt has not been agitated or aggressive since arrival. No medications, restraints or security interventions were required. Pt has been engaging with the 1:1 and other staff appropriately, however, RN notes that when pt’s wife was in the room he was acting oppositional and appeared to be manipulating her since whenever she would leave he would stop the behavior. Wife was often speaking for the  and stating he would lie about things but when she left the room pt was able to communicate without issue and appeared to be truthful. Pt calmly awaiting telepsychiatry interview

## 2020-07-26 NOTE — ED BEHAVIORAL HEALTH ASSESSMENT NOTE - HPI (INCLUDE ILLNESS QUALITY, SEVERITY, DURATION, TIMING, CONTEXT, MODIFYING FACTORS, ASSOCIATED SIGNS AND SYMPTOMS)
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 Patient states that he has been at baseline, is a little stressed with the kids home from college, but otherwise has been doing fine. States that his sleep is always all over the place, sleeps from around 1am through 530am when wife gets up. He states that starting on Thursday he began having severe RUQ pain, stabbing then aching, lasting for hours, with no clear exacerbating or alleviating factors. He states that he has always had stomach burning (did not clarify) but that this was categorically different based on intensity and quality of pain. He states that yesterday he threw up, and with his stomach hurting he decided he wanted to come to the ER. When asked specifically about taking klonopin, he stated that he took "a handful" yesterday morning though he states that he does not remember why, and that he did not think he was trying to kill himself. When asked specifically about cutting his wrist, he stated that he did this yesterday morning as well, he thinks after the klonopin, but that he again has no idea why he did this and he has been wracking his brain all day trying to figure it out. He states that he has been hospitalized around 18x in his life for severe anxiety but that he only tried to kill himself once many years ago, later saying that he lightly cut himself on the wrist. States that he has never had a period in which he did not remember why he did something or in which he tried to hurt himself but didn't remember it. He denies using drugs, but states that he drinks hard alcohol. He was vague on amount/frequency, not answering questions about quantity, at times giving conflicting answers. Stated that he drinks secretly because his wife does not drink, but then stated that he drinks bourbon with his wife. He denied ever having alcohol withdrawal, denies daily drinking, denies any history of auditory/visual hallucinations, denies history of paranoia, denies manic symptoms. Denies that there were any specific triggers this week or recently.     Collateral from wife Liane: States that patient had been doing well since start of pandemic as he has social anxiety and had enjoyed working from home. However she states that this last week has been very stressful for the whole family, that she had a hard time with her job and that he could not handle it, began to withdrawl. She states that normally hes very responsive to texts and has a predictable schedule. Wednesday he didn't come to bed until 530am (normally around 1230), may have started to drink a little. He didn't answer texts thursday, and she found that there was a water bottle with liquor in it. Friday she came home and he hadnt worked even though he told his boss he would, and she found another water bottle filled with liquor. He stated that he hated everyone and had a miserable life. She felt that he was taking pills but couldn't find any. Friday night into saturday he stumbled down the stairs (denies LOC), seemed uncoordinated but ate. Saturday she tried to talk to him but he locked himself in his room, had written a note saying that if she brought him to the hospital he would never forgive her. 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 Patient states that he has been at baseline, is a little stressed with the kids home from college, but otherwise has been doing fine. States that his sleep is always all over the place, sleeps from around 1am through 530am when wife gets up. He states that starting on Thursday he began having severe RUQ pain, stabbing then aching, lasting for hours, with no clear exacerbating or alleviating factors. He states that he has always had stomach burning (did not clarify) but that this was categorically different based on intensity and quality of pain. He states that yesterday he threw up, and with his stomach hurting he decided he wanted to come to the ER. When asked specifically about taking klonopin, he stated that he took "a handful" yesterday morning though he states that he does not remember why, and that he did not think he was trying to kill himself. When asked specifically about cutting his wrist, he stated that he did this yesterday morning as well, he thinks after the klonopin, but that he again has no idea why he did this and he has been wracking his brain all day trying to figure it out. He states that he has been hospitalized around 18x in his life for severe anxiety but that he only tried to kill himself once many years ago, later saying that he lightly cut himself on the wrist. States that he has never had a period in which he did not remember why he did something or in which he tried to hurt himself but didn't remember it. He denies using drugs, but states that he drinks hard alcohol. He was vague on amount/frequency, not answering questions about quantity, at times giving conflicting answers. Stated that he drinks secretly because his wife does not drink, but then stated that he drinks bourbon with his wife. He denied ever having alcohol withdrawal, denies daily drinking, denies any history of auditory/visual hallucinations, denies history of paranoia, denies manic symptoms. Denies that there were any specific triggers this week or recently.     Collateral from wife Liane: States that patient had been doing well since start of pandemic as he has social anxiety and had enjoyed working from home. However she states that this last week has been very stressful for the whole family, that she had a hard time with her job and that he could not handle it, began to withdrawl. She states that normally hes very responsive to texts and has a predictable schedule. Wednesday he didn't come to bed until 530am (normally around 1230), may have started to drink a little. He didn't answer texts thursday, and she found that there was a water bottle with liquor in it. Friday she came home and he hadnt worked even though he told his boss he would, and she found another water bottle filled with liquor. He stated that he hated everyone and had a miserable life. She felt that he was taking pills but couldn't find any. Friday night into saturday he stumbled down the stairs (denies LOC), seemed uncoordinated but ate. Saturday he was angry, accused her of taking his pills, emptied out the closet she thinks trying to find his pills. She tried to talk to him but he locked himself in his room, had written a note saying that if she brought him to the hospital he would never forgive her. Mid afternoon she heard stumbling, came upstairs to find door locked, got the key and found him in bed with blood al over, with a knife and a razor. It was pretty severe so she bandaged it. He had said that he took a lot of pills but she couldn't find the bottle. He seemed a little more with it mentally but still did not want to go. Around 33am he threw up and stated that he wanted to go to the hospital for his stomach. Wife states that this has happened before, last in october 2019 he was taken to the ER after he drank and took pills and [place].

## 2020-07-26 NOTE — ED BEHAVIORAL HEALTH ASSESSMENT NOTE - CURRENT MEDICATION
klonopin 1mg PO TID PRN for anxiety (confirmed via [facility]STOP, prescribed by Dr Yesi Jaffe, last filled 7/20/20

## 2020-07-26 NOTE — ED PROVIDER NOTE - SECONDARY DIAGNOSIS.
Pt not'd and sched   Laceration of wrist without complication, right, initial encounter Suicidal behavior with attempted self-injury

## 2020-07-26 NOTE — ED ADULT NURSE REASSESSMENT NOTE - NS ED NURSE REASSESS COMMENT FT1
patient received awake and alert, endorsing right sided abdominal pain, NONTENDER upon palpation, patient reports it as a stabbing pain that develops into an ache.  Meds given as ordered, patient updated about plan of care, constant observation maintained at this time.  Saline lock patent in left hand, Lung sounds clear to auscultation, bowel sounds present to all quadrants, bandage to right wrist area remains dry and intact.

## 2020-07-26 NOTE — ED PROVIDER NOTE - NOTES
discussed case, recommend 6 hour total observation, if baseline at 6hr can clear medically Transfer to [facility].

## 2020-07-26 NOTE — ED BEHAVIORAL HEALTH ASSESSMENT NOTE - RISK ASSESSMENT
Risk factors include recent suicide attempt, prior suicide attempts, insomnia, alcohol abuse, anxiety, stress from pandemic, family, job, lack of engagement in therapy. Protective factors include lack of current suicide ideation, lack of psychosis, lack of access to firearms. Overall patient appears to be at acutely elevated risk of suicide. High Acute Suicide Risk

## 2020-07-26 NOTE — ED PROVIDER NOTE - CARE PLAN
Principal Discharge DX:	Overdose of benzodiazepine, intentional self-harm, initial encounter  Secondary Diagnosis:	Laceration of wrist without complication, right, initial encounter  Secondary Diagnosis:	Suicidal behavior with attempted self-injury

## 2020-07-26 NOTE — ED ADULT NURSE REASSESSMENT NOTE - NS ED NURSE REASSESS COMMENT FT1
patient sitting in stretcher, no apparent distress, aware of pending psych transfer for admission.  Vitals as noted, offered assistance, will feed dinner when arrives.

## 2020-07-26 NOTE — ED PROVIDER NOTE - OBJECTIVE STATEMENT
65 y.o. M BIBwife for suicide attempt - says his life sucks - per wife this is not the first time - per wife pt wasn't taking any of his meds (cardiac) for past 4days, asked for them today, then pt filled his clonopin today - states was 140 pills ,says he took them all, was also trying to cut his right wrist with a razor and then a utility knife, was vomiting pills PTA, denies overdosing on any other medications      Cardiologist - LI Alex- 65 y.o. M BIBwife for suicide attempt - says his life sucks - per wife this is not the first time - per wife pt wasn't taking any of his meds (cardiac) for past 4days, asked for them today, then pt filled his clonopin today - states was 140 pills ,says he took them all at once tonight, not sure what time, also cut his right wrist with a razor and then a utility knife but this was yesterday, was vomiting pills PTA per wife (pt says vomited once outside, denies pills came up), denies overdosing on any other medications, c/o epigastric sharp pains, no h/o same, h/o etoh abuse per chart- states last drink was 2d ago, denies smoking or other drugs,       Cardiologist - LI Alex-

## 2020-07-26 NOTE — ED PROVIDER NOTE - CLINICAL SUMMARY MEDICAL DECISION MAKING FREE TEXT BOX
65 y.o. M BIB wife for suicide attempt, not the first time he has tried to hurt himself, yesterday cut right wrist, tonight at some point took entire bottle clonazepam, denies coingestants - iv, psych labs, supportive care for OD, wound care, will need to observe 6hr to see if able to medically clear for psych or may require medical admission

## 2020-07-26 NOTE — ED BEHAVIORAL HEALTH ASSESSMENT NOTE - ACTIVATING EVENTS/STRESSORS
Acute medical problem/Substance intoxication or withdrawal/Triggering events leading to humiliation, shame, and/or despair (e.g. Loss of relationship, financial or health status) (real or anticipated)

## 2020-07-26 NOTE — ED BEHAVIORAL HEALTH NOTE - BEHAVIORAL HEALTH NOTE
===================  PRE-HOSPITAL COURSE  ===================  SOURCE:  [facility]  DETAILS:  Wife drove pt to ED      ============  ED COURSE  ============  SOURCE:  NADJA Ingram  ARRIVAL:  Pt walked into ED  BELONGINGS: None as wife took everything back home   BEHAVIOR: Pt was brought to the ED by wife after overdosing on 140 mg Klonapin and cutting his right wrist with razor blade. Pt required 10 stitches to right wrist for self-inflicted laceration. Pt has been calm and cooperative in the ED, pt willingly complied with blood draws and urine sample. Pt presents as clean but depressed, unable to hold any long conversations. RN states that pt is not being 100 % truthful and believes that pt is trying to avoid psychiatric hospitalization. Pt complained of abdominal pain thus was given IV protonix. Pt ate breakfast which he tolerated well with no problem. No psychosis or manic symptoms reported or observed. Pt resting comfortably on stretcher.   TREATMENT: Protonix 40 mg IV, 10 stitches due to self-inflicted laceration to right wrist    VISITORS: Wife initially at bedside but then left.

## 2020-07-26 NOTE — ED PROCEDURE NOTE - PROCEDURE ADDITIONAL DETAILS
gauze wrap with overlying ace applied for pressure, good distal pulses and sensation both wounds 5cm in length in parallel approx 1cm apart - steristripped both with same strips - gauze wrap with overlying ace applied for pressure, good distal pulses and sensation

## 2020-07-26 NOTE — ED ADULT NURSE REASSESSMENT NOTE - NS ED NURSE REASSESS COMMENT FT1
patient provided with breakfast, being moved to private area to be evaluated by telepsych. patient provided with breakfast, being moved to private area to be evaluated by telepsych.  spoke to wife, updated on plan of care.  Advised that after she left this morning at 6am, [facility] advised patient would not be cleared for psych eval until after 10am.

## 2020-07-27 VITALS
OXYGEN SATURATION: 95 % | TEMPERATURE: 98 F | DIASTOLIC BLOOD PRESSURE: 78 MMHG | RESPIRATION RATE: 17 BRPM | HEART RATE: 81 BPM | SYSTOLIC BLOOD PRESSURE: 142 MMHG

## 2020-07-27 PROCEDURE — 90471 IMMUNIZATION ADMIN: CPT

## 2020-07-27 PROCEDURE — 90715 TDAP VACCINE 7 YRS/> IM: CPT

## 2020-07-27 PROCEDURE — 96374 THER/PROPH/DIAG INJ IV PUSH: CPT

## 2020-07-27 PROCEDURE — 80048 BASIC METABOLIC PNL TOTAL CA: CPT

## 2020-07-27 PROCEDURE — 87635 SARS-COV-2 COVID-19 AMP PRB: CPT

## 2020-07-27 PROCEDURE — 36415 COLL VENOUS BLD VENIPUNCTURE: CPT

## 2020-07-27 PROCEDURE — 81003 URINALYSIS AUTO W/O SCOPE: CPT

## 2020-07-27 PROCEDURE — 80307 DRUG TEST PRSMV CHEM ANLYZR: CPT

## 2020-07-27 PROCEDURE — 80076 HEPATIC FUNCTION PANEL: CPT

## 2020-07-27 PROCEDURE — 85027 COMPLETE CBC AUTOMATED: CPT

## 2020-07-27 PROCEDURE — 99285 EMERGENCY DEPT VISIT HI MDM: CPT | Mod: 25

## 2020-07-27 PROCEDURE — 12002 RPR S/N/AX/GEN/TRNK2.6-7.5CM: CPT

## 2020-07-27 PROCEDURE — 93005 ELECTROCARDIOGRAM TRACING: CPT

## 2020-07-27 RX ORDER — HYDROXYZINE HCL 10 MG
25 TABLET ORAL ONCE
Refills: 0 | Status: COMPLETED | OUTPATIENT
Start: 2020-07-27 | End: 2020-07-27

## 2020-07-27 RX ORDER — CLONAZEPAM 1 MG
1 TABLET ORAL ONCE
Refills: 0 | Status: DISCONTINUED | OUTPATIENT
Start: 2020-07-27 | End: 2020-07-27

## 2020-07-27 RX ORDER — AMLODIPINE BESYLATE 2.5 MG/1
10 TABLET ORAL ONCE
Refills: 0 | Status: COMPLETED | OUTPATIENT
Start: 2020-07-27 | End: 2020-07-27

## 2020-07-27 RX ORDER — CLOPIDOGREL BISULFATE 75 MG/1
75 TABLET, FILM COATED ORAL ONCE
Refills: 0 | Status: COMPLETED | OUTPATIENT
Start: 2020-07-27 | End: 2020-07-27

## 2020-07-27 RX ADMIN — CLOPIDOGREL BISULFATE 75 MILLIGRAM(S): 75 TABLET, FILM COATED ORAL at 15:18

## 2020-07-27 RX ADMIN — Medication 1 MILLIGRAM(S): at 14:51

## 2020-07-27 RX ADMIN — Medication 25 MILLIGRAM(S): at 02:28

## 2020-07-27 RX ADMIN — AMLODIPINE BESYLATE 10 MILLIGRAM(S): 2.5 TABLET ORAL at 15:17

## 2020-07-27 NOTE — ED BEHAVIORAL HEALTH NOTE - BEHAVIORAL HEALTH NOTE
Telepsychiatry Reassessment:    Pt reassessed at 9:46am.  He states that he "just woke up" so is not sure how he is feeling yet.  He states he has been in the ED for "3 days" and would like to be transferred [facility].  Pt states that he is uncertain why his medications were switched (klonopin to atarax) and states he usually takes klonopin 1 mg TID.     ISTOP Reference #: [id number]    Rx Written	Rx Dispensed	Drug	Quantity	Days Supply	Prescriber Name	Payment Method	Dispenser  07/07/2020	07/20/2020	clonazepam 1 mg tablet	90	30	Bajpayi, Priyadarshan	Insurance	[facility] #[id number]  06/09/2020	06/19/2020	clonazepam 1 mg tablet	90	30	Bajpayi, Priyadarshan	Insurance	[facility] #[id number]  05/12/2020	05/19/2020	clonazepam 1 mg tablet	90	30	Bajpayi, Priyadarshan	Insurance	[facility] #[id number]  04/14/2020	04/20/2020	clonazepam 1 mg tablet	60	30	Bajpayi, Priyadarshan	Insurance	[facility] #[id number]  03/17/2020	03/19/2020	clonazepam 1 mg tablet	60	30	Bajpayi, Priyadarshan	Insurance	[facility] #[id number]  02/01/2020	02/03/2020	clonazepam 1 mg tablet	60	30	Bajpayi, Priyadarshan	Insurance	[facility] #[id number]  12/17/2019	12/30/2019	clonazepam 1 mg tablet	60	30	Bajpayi, Priyadarshan	Insurance	[facility] #[id number]  11/23/2019	11/24/2019	clonazepam 1 mg tablet	60	30	Bajpayi, Priyadarshan	Insurance	[facility] #[id number]  10/12/2019	10/14/2019	clonazepam 1 mg tablet	60	30	Bajpayi, Priyadarshan	Insurance	[facility] #[id number]  08/17/2019	09/09/2019	clonazepam 1 mg tablet	60	30	Bajpayi, Priyadarshan	Insurance	[facility] #[id number]  07/06/2019	07/30/2019	clonazepam 1 mg tablet	60	30	Bajpayi, Priyadarshan	Insurance	[facility] #[id number]    A/P:  65M with long history of anxiety, multiple suicide attempts and hospitalizations, currently in monthly outpatient med management on klonopin 1mg PO TID PRN, now BIBEMS after patient took a handful of klonopin and cut his wrist with razor and knife in the setting of one week of increased etoh use and stress. Of primary concern is that patient states that he does not have any idea why he took the pills or cut his wrist, and per medical ED cut was right over the radial artery with significant bleeding per wife. He demonstrates poor insight and judgment as he states he does not know why he cut himself or took the pills, is more focused on talking about his prior stomach pain, and does not think he needs to be hospitalized despite not knowing why he almost killed himself. He would benefit from hospitalization and meets criteria for involuntary admission     - involuntary admission when bed becomes available   - KADEN   - can receive klonopin 1mg PO BID for anxiety with additional dose q24h PRN   - For agitaiton, haldol 5mg with ativan 2mg PO q 6h PRN. Can give IM if severe agitation or refusing PO   - Treatment of medical conditions per medical ED. Per records patient taking norvasc 10 qday for HTN, lipitor 20mg PO qDay for dyslipidemia plavix 75mg per day for stent, aspirin 81mg per day, pantoprozol 40mg per day.   - Requested ED team to evaluate pt's ambulatory status

## 2020-07-27 NOTE — ED BEHAVIORAL HEALTH NOTE - BEHAVIORAL HEALTH NOTE
Telepsych reassessment:    Patient reportedly sleeping in bed; update received from ED attending and RN. Patient reportedly in good behavioral control throughout the night, mostly sleeping. Around 2am patient requested klonopin for anxiety and received hydroxzyine instead with good effect. Patient returned to sleep and awoke again around 430am. At that time, patient became anxious again requesting to leave but was reportedly easily redirected by staff. No other issues.     A/P  65M with long history of anxiety, multiple suicide attempts and hospitalizations, currently in monthly outpatient med management on klonopin 1mg PO TID PRN, now BIBEMS after patient took a handful of klonopin and cut his wrist with razor and knife in the setting of one week of increased etoh use and stress.     Of primary concern is that patient states that he does not have any idea why he took the pills or cut his wrist, and per medical ED cut was right over the radial artery with significant bleeding per wife. He demonstrates poor insight and judgment as he states he does not know why he cut himself or took the pills, is more focused on talking about his prior stomach pain, and does not think he needs to be hospitalized despite not knowing why he almost killed himself. He would benefit from hospitalization and meets criteria for involuntary admission     - involuntary admission when bed becomes available   - CIWA   - can receive klonopin 1mg PO BID prn for anxiety   - For agitaiton, haldol 5mg with ativan 2mg PO q 6h PRN. Can give IM if severe agitation or refusing PO    - Treatment of medical conditions per medical ED. Per records patient taking norvasc 10 qday for HTN, lipitor 20mg PO qDay for dyslipidemia plavix 75mg per day for stent, aspirin 81mg per day, pantoprozol 40mg per day

## 2022-02-09 ENCOUNTER — APPOINTMENT (OUTPATIENT)
Dept: OPHTHALMOLOGY | Facility: CLINIC | Age: 67
End: 2022-02-09

## 2022-02-09 ENCOUNTER — NON-APPOINTMENT (OUTPATIENT)
Age: 67
End: 2022-02-09

## 2022-02-09 ENCOUNTER — APPOINTMENT (OUTPATIENT)
Dept: OPHTHALMOLOGY | Facility: CLINIC | Age: 67
End: 2022-02-09
Payer: COMMERCIAL

## 2022-02-09 ENCOUNTER — TRANSCRIPTION ENCOUNTER (OUTPATIENT)
Age: 67
End: 2022-02-09

## 2022-02-09 PROCEDURE — 92250 FUNDUS PHOTOGRAPHY W/I&R: CPT

## 2022-02-09 PROCEDURE — 92004 COMPRE OPH EXAM NEW PT 1/>: CPT

## 2022-02-09 PROCEDURE — 92012 INTRM OPH EXAM EST PATIENT: CPT | Mod: 1L

## 2022-03-02 ENCOUNTER — APPOINTMENT (OUTPATIENT)
Dept: OPHTHALMOLOGY | Facility: CLINIC | Age: 67
End: 2022-03-02
Payer: SELF-PAY

## 2022-03-02 ENCOUNTER — NON-APPOINTMENT (OUTPATIENT)
Age: 67
End: 2022-03-02

## 2022-03-02 PROCEDURE — 92310J: CUSTOM

## 2022-06-14 ENCOUNTER — APPOINTMENT (OUTPATIENT)
Dept: OPHTHALMOLOGY | Facility: CLINIC | Age: 67
End: 2022-06-14

## 2022-12-17 ENCOUNTER — EMERGENCY (EMERGENCY)
Facility: HOSPITAL | Age: 67
LOS: 1 days | Discharge: HOME | End: 2022-12-17
Attending: EMERGENCY MEDICINE | Admitting: EMERGENCY MEDICINE
Payer: MEDICARE

## 2022-12-17 VITALS
HEART RATE: 88 BPM | OXYGEN SATURATION: 99 % | TEMPERATURE: 98 F | DIASTOLIC BLOOD PRESSURE: 89 MMHG | RESPIRATION RATE: 18 BRPM | SYSTOLIC BLOOD PRESSURE: 156 MMHG

## 2022-12-17 DIAGNOSIS — Z98.89 OTHER SPECIFIED POSTPROCEDURAL STATES: Chronic | ICD-10-CM

## 2022-12-17 LAB
ALBUMIN SERPL ELPH-MCNC: 4.1 G/DL — SIGNIFICANT CHANGE UP (ref 3.3–5)
ALP SERPL-CCNC: 95 U/L — SIGNIFICANT CHANGE UP (ref 40–120)
ALT FLD-CCNC: 25 U/L — SIGNIFICANT CHANGE UP (ref 12–78)
ANION GAP SERPL CALC-SCNC: 5 MMOL/L — SIGNIFICANT CHANGE UP (ref 5–17)
APPEARANCE UR: CLEAR — SIGNIFICANT CHANGE UP
AST SERPL-CCNC: 29 U/L — SIGNIFICANT CHANGE UP (ref 15–37)
BASOPHILS # BLD AUTO: 0.05 K/UL — SIGNIFICANT CHANGE UP (ref 0–0.2)
BASOPHILS NFR BLD AUTO: 0.6 % — SIGNIFICANT CHANGE UP (ref 0–2)
BILIRUB SERPL-MCNC: 0.5 MG/DL — SIGNIFICANT CHANGE UP (ref 0.2–1.2)
BILIRUB UR-MCNC: NEGATIVE — SIGNIFICANT CHANGE UP
BUN SERPL-MCNC: 15 MG/DL — SIGNIFICANT CHANGE UP (ref 7–23)
CALCIUM SERPL-MCNC: 9.1 MG/DL — SIGNIFICANT CHANGE UP (ref 8.5–10.1)
CHLORIDE SERPL-SCNC: 106 MMOL/L — SIGNIFICANT CHANGE UP (ref 96–108)
CO2 SERPL-SCNC: 30 MMOL/L — SIGNIFICANT CHANGE UP (ref 22–31)
COLOR SPEC: SIGNIFICANT CHANGE UP
CREAT SERPL-MCNC: 1.1 MG/DL — SIGNIFICANT CHANGE UP (ref 0.5–1.3)
DIFF PNL FLD: ABNORMAL
EGFR: 74 ML/MIN/1.73M2 — SIGNIFICANT CHANGE UP
EOSINOPHIL # BLD AUTO: 0.25 K/UL — SIGNIFICANT CHANGE UP (ref 0–0.5)
EOSINOPHIL NFR BLD AUTO: 3 % — SIGNIFICANT CHANGE UP (ref 0–6)
GLUCOSE SERPL-MCNC: 95 MG/DL — SIGNIFICANT CHANGE UP (ref 70–99)
GLUCOSE UR QL: NEGATIVE — SIGNIFICANT CHANGE UP
HCT VFR BLD CALC: 47 % — SIGNIFICANT CHANGE UP (ref 39–50)
HGB BLD-MCNC: 16.5 G/DL — SIGNIFICANT CHANGE UP (ref 13–17)
IMM GRANULOCYTES NFR BLD AUTO: 0.6 % — SIGNIFICANT CHANGE UP (ref 0–0.9)
KETONES UR-MCNC: NEGATIVE — SIGNIFICANT CHANGE UP
LEUKOCYTE ESTERASE UR-ACNC: NEGATIVE — SIGNIFICANT CHANGE UP
LIDOCAIN IGE QN: 166 U/L — SIGNIFICANT CHANGE UP (ref 73–393)
LYMPHOCYTES # BLD AUTO: 1.78 K/UL — SIGNIFICANT CHANGE UP (ref 1–3.3)
LYMPHOCYTES # BLD AUTO: 21.2 % — SIGNIFICANT CHANGE UP (ref 13–44)
MCHC RBC-ENTMCNC: 30.3 PG — SIGNIFICANT CHANGE UP (ref 27–34)
MCHC RBC-ENTMCNC: 35.1 GM/DL — SIGNIFICANT CHANGE UP (ref 32–36)
MCV RBC AUTO: 86.4 FL — SIGNIFICANT CHANGE UP (ref 80–100)
MONOCYTES # BLD AUTO: 0.59 K/UL — SIGNIFICANT CHANGE UP (ref 0–0.9)
MONOCYTES NFR BLD AUTO: 7 % — SIGNIFICANT CHANGE UP (ref 2–14)
NEUTROPHILS # BLD AUTO: 5.67 K/UL — SIGNIFICANT CHANGE UP (ref 1.8–7.4)
NEUTROPHILS NFR BLD AUTO: 67.6 % — SIGNIFICANT CHANGE UP (ref 43–77)
NITRITE UR-MCNC: NEGATIVE — SIGNIFICANT CHANGE UP
NRBC # BLD: 0 /100 WBCS — SIGNIFICANT CHANGE UP (ref 0–0)
PH UR: 7 — SIGNIFICANT CHANGE UP (ref 5–8)
PLATELET # BLD AUTO: 340 K/UL — SIGNIFICANT CHANGE UP (ref 150–400)
POTASSIUM SERPL-MCNC: 4.1 MMOL/L — SIGNIFICANT CHANGE UP (ref 3.5–5.3)
POTASSIUM SERPL-SCNC: 4.1 MMOL/L — SIGNIFICANT CHANGE UP (ref 3.5–5.3)
PROT SERPL-MCNC: 8 G/DL — SIGNIFICANT CHANGE UP (ref 6–8.3)
PROT UR-MCNC: NEGATIVE — SIGNIFICANT CHANGE UP
RBC # BLD: 5.44 M/UL — SIGNIFICANT CHANGE UP (ref 4.2–5.8)
RBC # FLD: 12.1 % — SIGNIFICANT CHANGE UP (ref 10.3–14.5)
SODIUM SERPL-SCNC: 141 MMOL/L — SIGNIFICANT CHANGE UP (ref 135–145)
SP GR SPEC: 1.01 — SIGNIFICANT CHANGE UP (ref 1.01–1.02)
UROBILINOGEN FLD QL: NEGATIVE — SIGNIFICANT CHANGE UP
WBC # BLD: 8.39 K/UL — SIGNIFICANT CHANGE UP (ref 3.8–10.5)
WBC # FLD AUTO: 8.39 K/UL — SIGNIFICANT CHANGE UP (ref 3.8–10.5)

## 2022-12-17 PROCEDURE — 74176 CT ABD & PELVIS W/O CONTRAST: CPT | Mod: 26,MA

## 2022-12-17 PROCEDURE — 99285 EMERGENCY DEPT VISIT HI MDM: CPT

## 2022-12-17 RX ORDER — ACETAMINOPHEN 500 MG
1000 TABLET ORAL ONCE
Refills: 0 | Status: COMPLETED | OUTPATIENT
Start: 2022-12-17 | End: 2022-12-17

## 2022-12-17 RX ORDER — CEFUROXIME AXETIL 250 MG
1 TABLET ORAL
Qty: 14 | Refills: 0
Start: 2022-12-17 | End: 2022-12-23

## 2022-12-17 RX ORDER — TAMSULOSIN HYDROCHLORIDE 0.4 MG/1
1 CAPSULE ORAL
Qty: 7 | Refills: 0
Start: 2022-12-17 | End: 2022-12-23

## 2022-12-17 RX ORDER — HYDROMORPHONE HYDROCHLORIDE 2 MG/ML
1 INJECTION INTRAMUSCULAR; INTRAVENOUS; SUBCUTANEOUS ONCE
Refills: 0 | Status: DISCONTINUED | OUTPATIENT
Start: 2022-12-17 | End: 2022-12-17

## 2022-12-17 RX ORDER — MORPHINE SULFATE 50 MG/1
4 CAPSULE, EXTENDED RELEASE ORAL ONCE
Refills: 0 | Status: DISCONTINUED | OUTPATIENT
Start: 2022-12-17 | End: 2022-12-17

## 2022-12-17 RX ORDER — OXYCODONE AND ACETAMINOPHEN 5; 325 MG/1; MG/1
2 TABLET ORAL
Qty: 36 | Refills: 0
Start: 2022-12-17 | End: 2022-12-19

## 2022-12-17 RX ORDER — SODIUM CHLORIDE 9 MG/ML
1000 INJECTION INTRAMUSCULAR; INTRAVENOUS; SUBCUTANEOUS ONCE
Refills: 0 | Status: COMPLETED | OUTPATIENT
Start: 2022-12-17 | End: 2022-12-17

## 2022-12-17 RX ADMIN — Medication 400 MILLIGRAM(S): at 20:39

## 2022-12-17 RX ADMIN — HYDROMORPHONE HYDROCHLORIDE 1 MILLIGRAM(S): 2 INJECTION INTRAMUSCULAR; INTRAVENOUS; SUBCUTANEOUS at 21:47

## 2022-12-17 RX ADMIN — SODIUM CHLORIDE 1000 MILLILITER(S): 9 INJECTION INTRAMUSCULAR; INTRAVENOUS; SUBCUTANEOUS at 20:24

## 2022-12-17 RX ADMIN — MORPHINE SULFATE 4 MILLIGRAM(S): 50 CAPSULE, EXTENDED RELEASE ORAL at 20:24

## 2022-12-17 NOTE — ED PROVIDER NOTE - NSICDXPASTMEDICALHX_GEN_ALL_CORE_FT
PAST MEDICAL HISTORY:  Alcohol abuse     Anxiety     Benzodiazepine abuse     Depression     Hypertension     Suicidal behavior

## 2022-12-17 NOTE — ED ADULT NURSE NOTE - DOES PATIENT HAVE ADVANCE DIRECTIVE
What Type Of Note Output Would You Prefer (Optional)?: Standard Output How Severe Are Your Spot(S)?: mild Have Your Spot(S) Been Treated In The Past?: has not been treated Hpi Title: Evaluation of Skin Lesions No

## 2022-12-17 NOTE — ED PROVIDER NOTE - CLINICAL SUMMARY MEDICAL DECISION MAKING FREE TEXT BOX
67-year-old male with past medical history of hypertension and hyperlipidemia presents to the ER today complaining of acute onset of left flank abdominal pain x1 hour.  Patient notes pain to the left lower quadrant describes it as sharp, radiating around flank, no modifying factors, currently 10 out of 10.  Patient reports he was playing on the couch upon onset of pain.  Patient denies nausea, vomiting, fever, diarrhea, hematuria, dysuria, or any other complaints.Assistance to patient on exam neurologically intact with no pulsatile mass 2+ pulses throughout patient with no significant tenderness of the abdomen on exam.  History is consistent with potential kidney stone we will treat for pain IV fluids check labs and a urine and get a CT stone hunt to rule out renal stone.  Patient has denied fever or chills we will continue to monitor at this time 67-year-old male with past medical history of hypertension and hyperlipidemia presents to the ER today complaining of acute onset of left flank abdominal pain x1 hour.  Patient notes pain to the left lower quadrant describes it as sharp, radiating around flank, no modifying factors, currently 10 out of 10.  Patient reports he was playing on the couch upon onset of pain.  Patient denies nausea, vomiting, fever, diarrhea, hematuria, dysuria, or any other complaints.Assistance to patient on exam neurologically intact with no pulsatile mass 2+ pulses throughout patient with no significant tenderness of the abdomen on exam.  History is consistent with potential kidney stone we will treat for pain IV fluids check labs and a urine and get a CT stone hunt to rule out renal stone.  Patient has denied fever or chills we will continue to monitor at this time.  CAT scan reveals renal stone 3 mm patient without elevated white blood count fever chills or a lot of white cells in the urine.  We will discharge the patient on Percocet Ceftin and Flomax with urinary strainer to follow-up with urology on-call.  Return for fever or shaking chills or severe pain no

## 2022-12-17 NOTE — ED PROVIDER NOTE - PATIENT PORTAL LINK FT
You can access the FollowMyHealth Patient Portal offered by [facility] by registering at the following website: [email or web address]. By joining via[facility]’s FollowMyHealth portal, you will also be able to view your health information using other applications (apps) compatible with our system.

## 2022-12-17 NOTE — ED PROVIDER NOTE - NSFOLLOWUPINSTRUCTIONS_ED_ALL_ED_FT
Take Percocet 1 to 2 tablets every 4-6 hours as needed for pain.  Drink lots of fluids.  Take Ceftin 1 tablet twice daily.  Take Flomax 1 tablet prior to bed each night.  Call urology on Monday morning to arrange follow-up this week.  Strain all urine  RETURN IMMEDIATELY FOR FEVER OR CHILLS.

## 2022-12-17 NOTE — ED PROVIDER NOTE - CARE PROVIDER_API CALL
Ward Iglesias)  Urology  [address], [address]  [place] [address]  Phone: [phone]  Fax: [phone]  Follow Up Time: 1-3 Days

## 2022-12-17 NOTE — ED PROVIDER NOTE - OBJECTIVE STATEMENT
67-year-old male with past medical history of hypertension and hyperlipidemia presents to the ER today complaining of acute onset of left flank abdominal pain x1 hour.  Patient notes pain to the left lower quadrant describes it as sharp, radiating around flank, no modifying factors, currently 10 out of 10.  Patient reports he was playing on the couch upon onset of pain.  Patient denies nausea, vomiting, fever, diarrhea, hematuria, dysuria, or any other complaints.

## 2022-12-17 NOTE — ED PROVIDER NOTE - PHYSICAL EXAMINATION
Constitutional: Awake, Alert, non-toxic. NAD. Well appearing, well nourished.   HEAD: Normocephalic, atraumatic.   EYES: EOM intact, conjunctiva and sclera are clear bilaterally.   ENT: No rhinorrhea, patent, mucous membranes pink/moist, no drooling or stridor.   NECK: Supple, non-tender  CARDIOVASCULAR: Normal S1, S2; regular rate and rhythm.  RESPIRATORY: Normal respiratory effort; breath sounds CTAB, no wheezes, rhonchi, or rales. Speaking in full sentences. No accessory muscle use.   ABDOMEN: Soft; (+) LLQ TTP, no guarding or rebound TTP. (+) Left CVAT   EXTREMITIES: Full passive and active ROM in all extremities; non-tender to palpation; distal pulses palpable and symmetric  SKIN: Warm, dry; good skin turgor, no apparent lesions or rashes, no ecchymosis, brisk capillary refill.  NEURO: A&O x3. Sensory and motor functions are grossly intact. Speech is normal. Appearance and judgement seem appropriate for gender and age.

## 2022-12-17 NOTE — ED ADULT NURSE NOTE - OBJECTIVE STATEMENT
Left Message for a return phone call. Need to give results. 68 y/o male c/o left sided flank pain. Denies n/v/d

## 2022-12-18 VITALS
SYSTOLIC BLOOD PRESSURE: 139 MMHG | DIASTOLIC BLOOD PRESSURE: 81 MMHG | OXYGEN SATURATION: 99 % | TEMPERATURE: 98 F | RESPIRATION RATE: 18 BRPM | HEART RATE: 89 BPM

## 2022-12-18 PROCEDURE — 99284 EMERGENCY DEPT VISIT MOD MDM: CPT | Mod: 25

## 2022-12-18 PROCEDURE — 83690 ASSAY OF LIPASE: CPT

## 2022-12-18 PROCEDURE — 80053 COMPREHEN METABOLIC PANEL: CPT

## 2022-12-18 PROCEDURE — 85025 COMPLETE CBC W/AUTO DIFF WBC: CPT

## 2022-12-18 PROCEDURE — 96374 THER/PROPH/DIAG INJ IV PUSH: CPT

## 2022-12-18 PROCEDURE — 96375 TX/PRO/DX INJ NEW DRUG ADDON: CPT

## 2022-12-18 PROCEDURE — 81001 URINALYSIS AUTO W/SCOPE: CPT

## 2022-12-18 PROCEDURE — 36415 COLL VENOUS BLD VENIPUNCTURE: CPT

## 2022-12-18 PROCEDURE — 74176 CT ABD & PELVIS W/O CONTRAST: CPT | Mod: MA

## 2022-12-19 ENCOUNTER — APPOINTMENT (OUTPATIENT)
Dept: UROLOGY | Facility: CLINIC | Age: 67
End: 2022-12-19

## 2022-12-19 ENCOUNTER — NON-APPOINTMENT (OUTPATIENT)
Age: 67
End: 2022-12-19

## 2022-12-19 VITALS
OXYGEN SATURATION: 96 % | DIASTOLIC BLOOD PRESSURE: 84 MMHG | WEIGHT: 180 LBS | SYSTOLIC BLOOD PRESSURE: 124 MMHG | HEART RATE: 91 BPM | BODY MASS INDEX: 24.38 KG/M2 | HEIGHT: 72 IN

## 2022-12-19 DIAGNOSIS — N20.1 CALCULUS OF URETER: ICD-10-CM

## 2022-12-19 PROCEDURE — 99204 OFFICE O/P NEW MOD 45 MIN: CPT

## 2022-12-20 PROBLEM — N20.1 URETERAL STONE: Status: ACTIVE | Noted: 2022-12-19

## 2022-12-20 NOTE — PHYSICAL EXAM
[General Appearance - Well Developed] : well developed [General Appearance - Well Nourished] : well nourished [Normal Appearance] : normal appearance [Well Groomed] : well groomed [General Appearance - In No Acute Distress] : no acute distress [Edema] : no peripheral edema [Respiration, Rhythm And Depth] : normal respiratory rhythm and effort [Exaggerated Use Of Accessory Muscles For Inspiration] : no accessory muscle use [Abdomen Soft] : soft [Abdomen Tenderness] : non-tender [Costovertebral Angle Tenderness] : no ~M costovertebral angle tenderness [Urethral Meatus] : meatus normal [Urinary Bladder Findings] : the bladder was normal on palpation [Scrotum] : the scrotum was normal [Testes Mass (___cm)] : there were no testicular masses [Normal Station and Gait] : the gait and station were normal for the patient's age [] : no rash [No Focal Deficits] : no focal deficits [Oriented To Time, Place, And Person] : oriented to person, place, and time [Affect] : the affect was normal [Mood] : the mood was normal [Not Anxious] : not anxious [No Palpable Adenopathy] : no palpable adenopathy [FreeTextEntry1] : l

## 2022-12-20 NOTE — ASSESSMENT
[Ureteral Stone (592.1\N20.1)] : position [FreeTextEntry1] : 67-year-old male with past medical history of hypertension and hyperlipidemia who presented to the ER over the weekend with a 5mm left UVJ stone. Pain much more controlled now.\par \par We discussed the strategy of observation with medical expulsive therapy (MET). In general, 70-90% of stones <5mm will pass spontaneously and 20-60% of stones 5-10 mm will pass. In appropriate patients passing small stones, we often offer up to 4 to 6 weeks to attempt to pass stones without surgery.  If observation with MET is unsuccessful during this time period, if they develop any signs of infection, or the patient prefers to intervene sooner, we can proceed to definitive treatment.\par \par Today, the patient agreed to a trial of passage. I do not recommend significant travel, especially air travel during this time. I recommend increased water intake (4-5 liters per day). The patient understands that medical expulsive therapy is an off label use of medications. I prescribed the following:\par \par 1. Tamsulosin: This is used to help relax the ureter and help the patient pass the stone. The patient understands that this medication may cause dizziness, retrograde ejaculation, nasal congestion or other potential side effects. I recommended that the patient take this medication at night. If the side effects become too bothersome, the medication can be discontinued.\par \par 2. Non-steroidal anti-inflammatories (NSAIDs), e.g. Motrin, Ibuprofen, Aleve: This is used to reduce swelling and spasms of the ureter. These medications can cause GI upset, kidney damage, and other side effects if you have certain medical conditions.\par \par 3. Opiate analgesics: The patient understands that opiates can cause nausea, vomiting, additions, lethargy, somnolence and even death. I recommend that the patient not drive, operate heavy machinery or make important decisions while taking opiates. I have also informed the patient that these medications are dangerous when taken with other sedatives such as sleep medications, anti-anxiety medications and alcohol.\par \par I explained to the patient that they should come to the ER if they develop unrelenting severe pain, significant nausea/vomiting, or fever >101.\par \par Barring any issues, we will give the patient a trial of passage for the next 2 weeks. I have asked the patient to have the following tests and return to see me afterwards:\par --RBUS: the patient understands RBUS only visualize the kidneys and bladder and the segments of urine close to these parts. As such, diagnosis of a ureteral stone relies upon the presence of hydro or lack of a ureteral jet. Equivocal results (e.g. Lack of hydro with lack of ureteral jet) may necessitate a repeat CT scan\par \par

## 2022-12-20 NOTE — HISTORY OF PRESENT ILLNESS
[FreeTextEntry1] : 67-year-old male with past medical history of hypertension and hyperlipidemia who presented to the ER over the weekend complaining of acute onset of left flank abdominal pain x1 hour.  CT scan at ED documenting a 5mm left UVJ stone.  Patient denies nausea, vomiting, fever, diarrhea, hematuria, dysuria, or any other complaints.\par Pain much more controlled now.\par

## 2022-12-29 ENCOUNTER — OUTPATIENT (OUTPATIENT)
Dept: OUTPATIENT SERVICES | Facility: HOSPITAL | Age: 67
LOS: 1 days | End: 2022-12-29
Payer: COMMERCIAL

## 2022-12-29 ENCOUNTER — APPOINTMENT (OUTPATIENT)
Dept: ULTRASOUND IMAGING | Facility: CLINIC | Age: 67
End: 2022-12-29
Payer: COMMERCIAL

## 2022-12-29 DIAGNOSIS — Z98.89 OTHER SPECIFIED POSTPROCEDURAL STATES: Chronic | ICD-10-CM

## 2022-12-29 DIAGNOSIS — N20.1 CALCULUS OF URETER: ICD-10-CM

## 2022-12-29 PROCEDURE — 76770 US EXAM ABDO BACK WALL COMP: CPT | Mod: 26

## 2022-12-29 PROCEDURE — 76770 US EXAM ABDO BACK WALL COMP: CPT

## 2023-01-01 NOTE — ED BEHAVIORAL HEALTH ASSESSMENT NOTE - NS ED BHA REVIEW OF ED CHART VITAL SIGNS REVIEWED
1. Right knee pain, unspecified chronicity      -Patient is following up for right knee pain and swelling likely due to cartilage and/or meniscus injuries  -Patient may continue with Celebrex as needed just over the next month  -Patient will try Voltaren gel as needed  -Patient will continue with therapy and home exercise program  -Patient has decreased swelling in his knee today and so deferred aspiration and synovial fluid analysis  -Patient was evaluated in office today with an ultrasound for posterior knee pain and swelling.  No popliteal cyst was seen.  His posterior knee pain is likely due to injury of the posterior horn of the medial meniscus.  -Patient should focus on biking and elliptical for regular cardiovascular activity.  Patient may continue strength training as tolerated.  We discussed modifications of his workouts and improvements in technique of the lower body exercises.  -Patient will follow up if symptoms do not continue to improve or if significant swelling returns  -Call direct clinic number [[phone]] at any time with questions or concerns.    Albert Yeo MD [facility]  [facility] [place]         Yes 27

## 2023-01-05 ENCOUNTER — APPOINTMENT (OUTPATIENT)
Dept: UROLOGY | Facility: CLINIC | Age: 68
End: 2023-01-05
Payer: COMMERCIAL

## 2023-01-05 VITALS
HEIGHT: 72 IN | HEART RATE: 72 BPM | SYSTOLIC BLOOD PRESSURE: 127 MMHG | OXYGEN SATURATION: 97 % | WEIGHT: 186 LBS | DIASTOLIC BLOOD PRESSURE: 85 MMHG | RESPIRATION RATE: 12 BRPM | BODY MASS INDEX: 25.19 KG/M2 | TEMPERATURE: 97 F

## 2023-01-05 PROCEDURE — 99213 OFFICE O/P EST LOW 20 MIN: CPT

## 2023-01-05 NOTE — HISTORY OF PRESENT ILLNESS
[FreeTextEntry1] : 67-year-old male with past medical history of hypertension and hyperlipidemia who presented to the ER over the weekend complaining of acute onset of left flank abdominal pain x1 hour.  CT scan at ED documenting a 5mm left UVJ stone.  Patient denies nausea, vomiting, fever, diarrhea, hematuria, dysuria, or any other complaints.\par Pain much more controlled now.\par \par \par 1/5/23\par Patient with no pain. has not seen a kidney pass.\par US: no stone no hydro

## 2023-01-05 NOTE — PHYSICAL EXAM
[General Appearance - Well Developed] : well developed [General Appearance - Well Nourished] : well nourished [Normal Appearance] : normal appearance [Well Groomed] : well groomed [General Appearance - In No Acute Distress] : no acute distress [Abdomen Soft] : soft [Abdomen Tenderness] : non-tender [Costovertebral Angle Tenderness] : no ~M costovertebral angle tenderness [Urethral Meatus] : meatus normal [Urinary Bladder Findings] : the bladder was normal on palpation [Scrotum] : the scrotum was normal [Testes Mass (___cm)] : there were no testicular masses [Prostate Enlargement] : the prostate was not enlarged [No Prostate Nodules] : no prostate nodules [Edema] : no peripheral edema [] : no respiratory distress [Respiration, Rhythm And Depth] : normal respiratory rhythm and effort [Exaggerated Use Of Accessory Muscles For Inspiration] : no accessory muscle use [Oriented To Time, Place, And Person] : oriented to person, place, and time [Affect] : the affect was normal [Mood] : the mood was normal [Not Anxious] : not anxious [Normal Station and Gait] : the gait and station were normal for the patient's age [No Focal Deficits] : no focal deficits [No Palpable Adenopathy] : no palpable adenopathy

## 2023-01-05 NOTE — ASSESSMENT
[FreeTextEntry1] : 67-year-old male with past medical history of hypertension and hyperlipidemia who presented to the ER over the weekend with a 5mm left UVJ stone. Normal Ultrasound. Patient likely passed the stone.\par \par LIFESTYLE MANAGEMENT OF RENAL STONES\par Today we discussed general causes of stone formation and stone prevention tips:\par \par 1. Importance of fluid intake: Dehydration and inadequate fluid intake have been implicated in the formation of kidney stones. Try to drink at least 3 liters of liquid each day (about ten, 10-ounce glasses). This is a great way to lower your risk of forming new stones. Aim to replace fluids lost when you sweat from exercise or in hot weather. All fluids count, but try to drink water or mostly no-calorie or low-calorie drinks and limit sugar-sweetened or alcoholic drinks.\par \par 2. Nutritional: The diet plays an important role in stone formation. \par - Try to limit salt to less than 2,300 mg of salt per day \par - Limit the amount of meat in your diet. \par - Eat at least 5 to 9 servings of fruits and vegetables daily. Eating fruits and vegetables gives you potassium, fiber, magnesium, antioxidants and citrate. These elements are known to help prevent stones from forming. \par - Eat the recommended amount of calcium (5860-8159 mg/day). Identify calcium-rich foods or beverages like milk, calcium-fortified non-dairy milks, yogurt, cheese or broccoli to eat with meals.\par - Control the amount of oxalate in your diet by limiting common high-oxalate foods, such as spinach, kale, rhubarb, almonds and dark chocolate.\par \par 3. Obesity and diabetes: Increased body mass puts patients at increased risk for nephrolithiasis.  Increased weight increases metabolic acidosis, in part because of higher cell turnover and metabolism. Weight loss and glucose control will help prevent future stone formation.\par \par 4. Genetic: The most common type of urinary stone is calcium oxalate and this is linked to family history of stones. WIth metabolic and genetic testing, the exact etiology can be determined and a treatment plan can be formulated.\par \par 5. Supplements and vitamins: Certain supplements, such as protein shakes and creatine, increase the risk of nephrolithiasis. Vitamin C in amounts exceeding 1000 mg a day can also be a factor, since vitamin C (ascorbic acid) is metabolized by the liver into oxalic acid and is then excreted by the kidney as oxalate, a major component of most kidney stones. Excessive calcium supplementation can also be a risk. \par  \par 6. Urine pH: Low urine pH (<5.7) can increase the risk for uric acid and calcium oxalate stones. However, calcium phosphate stones tend to form at higher urinary pH values.\par \par \par Dietary recommendations\par \par Because of the patient's history of urolithiasis, today we extensively discussed dietary modifications that can be made to reduce stone frequency, growth and recurrence. Patient was given the following recommendations to prevent stone formation in the future: \par \par - HYDRATION: Increase water intake by drinking at least 2.5 liters of water daily (or 8-to-12 8-oz glasses) of water daily \par \par - CITRATE: Add fresh squeezed lemon juice to water to increase citrate in diet which would help to prevent making new kidney stones. Other sources of citrate include diet lemon-lime soft drinks and sugar-free lemonade like Crystal Light.\par \par - SALT: Limit salt intake to less than 3000 miligrams of sodium daily. Monitor intake of foods high in salt content such as salt seasonings, MSG, soy sauce, salted crackers and snack foods, pickled vegetables and fruits, cured and processed meats, canned soups, frozen meals. Sodium, often from salt, causes the kidneys to excrete more calcium into the urine. High concentrations of calcium in the urine combine with oxalate and phosphorus to form stones. Reducing sodium intake is preferred to reducing calcium intake. \par \par - OXALATE: Avoid foods very high in oxalate content >25mg: chocolate, green leafy vegetables, dried fruit, drinks such as chocolate drink mixes, Ovaltine, instant iced tea, many nuts and fats such as peanuts, almonds, cashews. \par \par - CALCIUM: Calcium from food does not increase the risk of calcium oxalate stones. Calcium in the digestive tract binds to oxalate from food and keeps it from entering the blood, and then the urinary tract, where it can form stones. Eat the recommended amount of calcium (3-4 servings or 0946-3234 mg/day). Identify calcium-rich foods or beverages like milk, calcium-fortified non-dairy milks, yogurt, cheese or broccoli to eat with meals.\par \par - URIC ACID: Limit meats and other animal protein-such as eggs and fish-contain purines, which breakdown into uric acid in the urine. Foods especially rich in purines include organ meats, such as liver. People who form uric acid stones should limit their meat consumption to 6 ounces each day.  Animal protein may also raise the risk of calcium stones by increasing the excretion of calcium and reducing the excretion of citrate into the urine. \par \par \par \par

## 2023-03-15 NOTE — ED ADULT TRIAGE NOTE - SPO2 (%)
[facility] [address] [facility]     Patient Name: Genoveva Sheridan  Encounter Date: 3/15/2023     Have you been cared for by a [place] [address] Dermatologist in the last 3 years and, if so, which description applies to you? NO  I am considered a "new" patient and must complete all patient intake questions  I am MALE/not capable of bearing children  REVIEW OF SYSTEMS:  Have you recently had or currently have any of the following? · Recent fever or chills? No  · Any non-healing wound? No   PAST MEDICAL HISTORY:  Have you personally ever had or currently have any of the following? If "YES," then please provide more detail  · Skin cancer (such as Melanoma, Basal Cell Carcinoma, Squamous Cell Carcinoma? YES, Melanoma 1990, shoulder  · Tuberculosis, HIV/AIDS, Hepatitis B or C: No  · Systemic Immunosuppression such as Diabetes, Biologic or Immunotherapy, Chemotherapy, Organ Transplantation, Bone Marrow Transplantation YES, Diabetes   · Radiation Treatment No   FAMILY HISTORY:  Any "first degree relatives" (parent, brother, sister, or child) with the following? • Skin Cancer, Pancreatic or Other Cancer? No   PATIENT EXPERIENCE:    • Do you want the Dermatologist to perform a COMPLETE skin exam today including a clinical examination under the "bra and underwear" areas? Yes  • If necessary, do we have your permission to call and leave a detailed message on your Preferred Phone number that includes your specific medical information?   Yes      No Known Allergies   Current Outpatient Medications:   •  acetaminophen (TYLENOL) 325 mg tablet, Take 2 tablets (650 mg total) by mouth every 6 (six) hours as needed for mild pain, Disp: 30 tablet, Rfl: 0  •  albuterol (2 5 mg/3 mL) 0 083 % nebulizer solution, Take 3 mL (2 5 mg total) by nebulization every 6 (six) hours as needed for wheezing or shortness of breath, Disp: 360 mL, Rfl: 5  •  Anoro Ellipta 62 5-25 MCG/ACT inhaler, INHALE ONE PUFF BY MOUTH ONCE DAILY, Disp: 60 each, Rfl: 0  •  Ascorbic Acid (VITAMIN C) 1000 MG tablet, Take 1 tablet by mouth daily, Disp: , Rfl:   •  BD PEN NEEDLE DON U/F 32G X 4 MM MISC,  , Disp: , Rfl:   •  betamethasone, augmented, (DIPROLENE-AF) 0 05 % cream, , Disp: , Rfl:   •  cholecalciferol (VITAMIN D3) 1,000 units tablet, Take 1,000 Units by mouth daily, Disp: , Rfl:   •  cholestyramine (QUESTRAN) 4 g packet, dissolve one packet in 8 ounces of liquid and drink by mouth once daily, Disp: 30 each, Rfl: 0  •  Cinnamon 500 MG TABS, Take 1 tablet by mouth daily  , Disp: , Rfl:   •  Daliresp 500 MCG tablet, TAKE ONE TABLET BY MOUTH ONCE DAILY, Disp: 30 tablet, Rfl: 3  •  Dapagliflozin Propanediol 5 MG TABS, Take 5 mg by mouth every other day Take 1/2 tablet daily ( Farxiga) , Disp: , Rfl:   •  Flovent  MCG/ACT inhaler, INHALE ONE PUFF TWICE DAILY - rinse mouth after use, Disp: 12 g, Rfl: 0  •  fluticasone (FLONASE) 50 mcg/act nasal spray, USE TWO SPRAYS IN EACH NOSTRIL DAILY, Disp: 16 g, Rfl: 0  •  gabapentin (NEURONTIN) 300 mg capsule, TAKE ONE CAPSULE BY MOUTH ONCE DAILY, Disp: 90 capsule, Rfl: 0  •  hydrOXYzine pamoate (VISTARIL) 25 mg capsule, TAKE ONE CAPSULE BY MOUTH THREE TIMES DAILY AS NEEDED FOR ITCHING, Disp: 90 capsule, Rfl: 0  •  Insulin Glargine Solostar 100 UNIT/ML SOPN, Inject 10 Units under the skin daily, Disp: , Rfl:   •  Levemir FlexTouch 100 units/mL injection pen, 10 Units daily at bedtime  (Patient not taking: Reported on 2/22/2023), Disp: , Rfl:   •  metoprolol succinate (TOPROL-XL) 50 mg 24 hr tablet, Take 50 mg by mouth daily, Disp: , Rfl:   •  montelukast (SINGULAIR) 10 mg tablet, TAKE ONE TABLET BY MOUTH NIGHTLY AT BEDTIME, Disp: 90 tablet, Rfl: 3  •  Multiple Vitamins-Minerals (OCUVITE ADULT 50+ PO), Take 1 tablet by mouth daily, Disp: , Rfl:   •  NOVOLOG FLEXPEN 100 units/mL injection pen, Inject 4 Units under the skin 3 (three) times a day with meals 4u in am, 2u at lunch and 6u at dinner , Disp: , Rfl:   •  ONE TOUCH ULTRA TEST test strip,  , Disp: , Rfl:   •  ONETOUCH DELICA LANCETS FINE MISC,  , Disp: , Rfl:   •  sacubitril-valsartan (Entresto) 24-26 MG TABS, Take 1 tablet by mouth 2 (two) times a day, Disp: 180 tablet, Rfl: 3  •  simvastatin (ZOCOR) 10 mg tablet, TAKE ONE TABLET BY MOUTH ONCE DAILY, Disp: 90 tablet, Rfl: 1  •  Ventolin  (90 Base) MCG/ACT inhaler, Inhale 2 puffs every 6 (six) hours as needed for wheezing or shortness of breath, Disp: 18 g, Rfl: 0          • Whom besides the patient is providing clinical information about today's encounter?   o NO ADDITIONAL HISTORIAN (patient alone provided history)    Physical Exam and Assessment/Plan by Diagnosis:      MELANOCYTIC NEVI ("Moles")    Physical Exam:  • Anatomic Location Affected:   Mostly on sun-exposed areas of the trunk and extremities  • Morphological Description:  Scattered, 1-4mm round to ovoid, symmetrical-appearing, even bordered, skin colored to dark brown macules/papules, mostly in sun-exposed areas  • Pertinent Positives:  • Pertinent Negatives: Additional History of Present Condition:      Assessment and Plan:  Based on a thorough discussion of this condition and the management approach to it (including a comprehensive discussion of the known risks, side effects and potential benefits of treatment), the patient (family) agrees to implement the following specific plan:  • When outside we recommend using a wide brim hat, sunglasses, long sleeve and pants, sunscreen with SPF 88+ with reapplication every 2 hours, or SPF specific clothing   • Benign, reassured  • Annual skin check     Melanocytic Nevi  Melanocytic nevi ("moles") are tan or brown, raised or flat areas of the skin which have an increased number of melanocytes  Melanocytes are the cells in our body which make pigment and account for skin color  Some moles are present at birth (I e , "congenital nevi"), while others come up later in life (i e , "acquired nevi")    The sun can stimulate the body to make more moles  Sunburns are not the only thing that triggers more moles  Chronic sun exposure can do it too  Clinically distinguishing a healthy mole from melanoma may be difficult, even for experienced dermatologists  The "ABCDE's" of moles have been suggested as a means of helping to alert a person to a suspicious mole and the possible increased risk of melanoma  The suggestions for raising alert are as follows:    Asymmetry: Healthy moles tend to be symmetric, while melanomas are often asymmetric  Asymmetry means if you draw a line through the mole, the two halves do not match in color, size, shape, or surface texture  Asymmetry can be a result of rapid enlargement of a mole, the development of a raised area on a previously flat lesion, scaling, ulceration, bleeding or scabbing within the mole  Any mole that starts to demonstrate "asymmetry" should be examined promptly by a board certified dermatologist      Border: Healthy moles tend to have discrete, even borders  The border of a melanoma often blends into the normal skin and does not sharply delineate the mole from normal skin  Any mole that starts to demonstrate "uneven borders" should be examined promptly by a board certified dermatologist      Color: Healthy moles tend to be one color throughout  Melanomas tend to be made up of different colors ranging from dark black, blue, white, or red  Any mole that demonstrates a color change should be examined promptly by a board certified dermatologist      Diameter: Healthy moles tend to be smaller than 0 6 cm in size; an exception are "congenital nevi" that can be larger  Melanomas tend to grow and can often be greater than 0 6 cm (1/4 of an inch, or the size of a pencil eraser)  This is only a guideline, and many normal moles may be larger than 0 6 cm without being unhealthy    Any mole that starts to change in size (small to bigger or bigger to smaller) should be examined promptly by a board certified dermatologist      Evolving: Healthy moles tend to "stay the same "  Melanomas may often show signs of change or evolution such as a change in size, shape, color, or elevation  Any mole that starts to itch, bleed, crust, burn, hurt, or ulcerate or demonstrate a change or evolution should be examined promptly by a board certified dermatologist         LENTIGO    Physical Exam:  • Anatomic Location Affected:  Trunk and bilateral upper extremities   • Morphological Description:  Light brown macules  • Pertinent Positives:  • Pertinent Negatives: Additional History of Present Condition:      Assessment and Plan:  Based on a thorough discussion of this condition and the management approach to it (including a comprehensive discussion of the known risks, side effects and potential benefits of treatment), the patient (family) agrees to implement the following specific plan:  • When outside we recommend using a wide brim hat, sunglasses, long sleeve and pants, sunscreen with SPF 94+ with reapplication every 2 hours, or SPF specific clothing       What is a lentigo? A lentigo is a pigmented flat or slightly raised lesion with a clearly defined edge  Unlike an ephelis (freckle), it does not fade in the winter months  There are several kinds of lentigo  The name lentigo originally referred to its appearance resembling a small lentil  The plural of lentigo is lentigines, although “lentigos” is also in common use  Who gets lentigines? Lentigines can affect males and females of all ages and races  Solar lentigines are especially prevalent in fair skinned adults  Lentigines associated with syndromes are present at birth or arise during childhood  What causes lentigines?   Common forms of lentigo are due to exposure to ultraviolet radiation:  • Sun damage including sunburn   • Indoor tanning   • Phototherapy, especially photochemotherapy (PUVA)    Ionizing radiation, eg radiation therapy, 96 can also cause lentigines  Several familial syndromes associated with widespread lentigines originate from mutations in Chavez-MAP kinase, mTOR signaling and PTEN pathways  What is the treatment for lentigines? Most lentigines are left alone  Attempts to lighten them may not be successful  The following approaches are used:  • SPF 50+ broad-spectrum sunscreen   • Hydroquinone bleaching cream   • Alpha hydroxy acids   • Vitamin C   • Retinoids   • Azelaic acid   • Chemical peels  Individual lesions can be permanently removed using:  • Cryotherapy   • Intense pulsed light   • Pigment lasers    How can lentigines be prevented? Lentigines associated with exposure ultraviolet radiation can be prevented by very careful sun protection  Clothing is more successful at preventing new lentigines than are sunscreens  What is the outlook for lentigines? Lentigines usually persist  They may increase in number with age and sun exposure  Some in sun-protected sites may fade and disappear  QUINONES ANGIOMAS    Physical Exam:  • Anatomic Location Affected:  trunk  • Morphological Description:  Scattered cherry red, 1-4 mm papules  • Pertinent Positives:  • Pertinent Negatives: Additional History of Present Condition:      Assessment and Plan:  Based on a thorough discussion of this condition and the management approach to it (including a comprehensive discussion of the known risks, side effects and potential benefits of treatment), the patient (family) agrees to implement the following specific plan:  • Monitor for changes  • Benign, reassured  •     Assessment and Plan:    Cherry angioma, also known as Alfrieda Age spots, are benign vascular skin lesions  A "cherry angioma" is a firm red, blue or purple papule, 0 1-1 cm in diameter  When thrombosed, they can appear black in colour until evaluated with a dermatoscope when the red or purple colour is more easily seen   Cherry angioma may develop on any part of the body but most often appear on the scalp, face, lips and trunk  An angioma is due to proliferating endothelial cells; these are the cells that line the inside of a blood vessel  Angiomas can arise in early life or later in life; the most common type of angioma is a cherry angioma  Cherry angiomas are very common in males and females of any age or race  They are more noticeable in white skin than in skin of colour  They markedly increase in number from about the age of 36  There may be a family history of similar lesions  Eruptive cherry angiomas have been rarely reported to be associated with internal malignancy  The cause of angiomas is unknown  Genetic analysis of cherry angiomas has shown that they frequently carry specific somatic missense mutations in the GNAQ and GNA11 (Q209H) genes, which are involved in other vascular and melanocytic proliferations  SEBORRHEIC KERATOSIS; NON-INFLAMED    Physical Exam:  • Anatomic Location Affected:  trunk  • Morphological Description:  Flat and raised, waxy, smooth to warty textured, yellow to brownish-grey to dark brown to blackish, discrete, "stuck-on" appearing papules  • Pertinent Positives:  • Pertinent Negatives: Additional History of Present Condition:      Assessment and Plan:  Based on a thorough discussion of this condition and the management approach to it (including a comprehensive discussion of the known risks, side effects and potential benefits of treatment), the patient (family) agrees to implement the following specific plan:  • Monitor for changes  • Benign, reassured  •     Seborrheic Keratosis  A seborrheic keratosis is a harmless warty spot that appears during adult life as a common sign of skin aging  Seborrheic keratoses can arise on any area of skin, covered or uncovered, with the usual exception of the palms and soles  They do not arise from mucous membranes  Seborrheic keratoses can have highly variable appearance        Seborrheic keratoses are extremely common  It has been estimated that over 90% of adults over the age of 61 years have one or more of them  They occur in males and females of all races, typically beginning to erupt in the 35s or 45s  They are uncommon under the age of 21 years  The precise cause of seborrhoeic keratoses is not known  Seborrhoeic keratoses are considered degenerative in nature  As time goes by, seborrheic keratoses tend to become more numerous  Some people inherit a tendency to develop a very large number of them; some people may have hundreds of them  There is no easy way to remove multiple lesions on a single occasion  Unless a specific lesion is "inflamed" and is causing pain or stinging/burning or is bleeding, most insurance companies do not authorize treatment  XEROSIS ("DRY SKIN")    Physical Exam:  • Anatomic Location Affected:  diffuse  • Morphological Description:  xerosis  • Pertinent Positives:  • Pertinent Negatives: Additional History of Present Condition:      Assessment and Plan:  Based on a thorough discussion of this condition and the management approach to it (including a comprehensive discussion of the known risks, side effects and potential benefits of treatment), the patient (family) agrees to implement the following specific plan:  • Use moisturizer like plain Vaseline, Eucerin,Cerave or Aveeno Cream 3 times a day for the dry skin   •   •      - Recommend sensitive skin care regimen  - detergent free of dyes and perfumes (example, free and clear)  Try washing clothes with extra rinse cycle  - Short lukewarm showers  - White dove bar soap  - - avoid aerosols and fragrances in the home (candles, plug ins, perfume, air freshener, etc)  Advised patient that the hydroxyzine 25 mg can cause drowsiness and to be cautious when taking it  Be careful when driving  If you can, we recommend, taking this only once a day, in the evening        Dry skin refers to skin that feels dry to touch  Dry skin has a dull surface with a rough, scaly quality  The skin is less pliable and cracked  When dryness is severe, the skin may become inflamed and fissured  Although any body site can be dry, dry skin tends to affect the shins more than any other site  Dry skin is lacking moisture in the outer horny cell layer (stratum corneum) and this results in cracks in the skin surface  Dry skin is also called xerosis, xeroderma or asteatosis (lack of fat)  It can affect males and females of all ages  There is some racial variability in water and lipid content of the skin  • Dry skin that starts in early childhood may be one of about 20 types of ichthyosis (fish-scale skin)  There is often a family history of dry skin  • Dry skin is commonly seen in people with atopic dermatitis  • Nearly everyone > 60 years has dry skin  Dry skin that begins later may be seen in people with certain diseases and conditions  • Postmenopausal women  • Hypothyroidism  • Chronic renal disease   • Malnutrition and weight loss   • Subclinical dermatitis   • Treatment with certain drugs such as oral retinoids, diuretics and epidermal growth factor receptor inhibitors      What is the treatment for dry skin? The mainstay of treatment of dry skin and ichthyosis is moisturisers/emollients  They should be applied liberally and often enough to:  • Reduce itch   • Improve the barrier function   • Prevent entry of irritants, bacteria   • Reduce transepidermal water loss  How can dry skin be prevented? Eliminate aggravating factors:  • Reduce the frequency of bathing  • A humidifier in winter and air conditioner in summer   • Compare having a short shower with a prolonged soak in a bath  • Use lukewarm, not hot, water     • Replace standard soap with a substitute such as a synthetic detergent cleanser, water-miscible emollient, bath oil, anti-pruritic tar oil, colloidal oatmeal etc    • Apply an emollient liberally and often, particularly shortly after bathing, and when itchy  The drier the skin, the thicker this should be, especially on the hands  What is the outlook for dry skin? A tendency to dry skin may persist life-long, or it may improve once contributing factors are controlled  HISTORY OF MELANOMA    Physical Exam:  • Year Treated: 1990  • TNM Classification:   • Suspected Recurrence: no  • Regional adenopathy: no    Additional History of Present Condition:  Surgery in 1990  Patient unsure of which shoulder it was located on  Last skin exam was approximately 5 years ago  Assessment and Plan:  Based on a thorough discussion of this condition and the management approach to it (including a comprehensive discussion of the known risks, side effects and potential benefits of treatment), the patient (family) agrees to implement the following specific plan:  • When outside we recommend using a wide brim hat, sunglasses, long sleeve and pants, sunscreen with SPF 25+ with reapplication every 2 hours, or SPF specific clothing   • We recommend that you continue to have regular full body skin exams with your dermatologist   • We recommend that you see your eye doctor and dentist yearly for exams and make sure they are aware of your past history of Melanoma  What happens at follow-up? The main purpose of follow-up is to detect recurrences early (metastatic melanoma), but it also offers an opportunity to diagnose a new primary melanoma at the first possible opportunity  A second invasive melanoma occurs in 5-10% of melanoma patients and a new melanoma in situ is diagnosed in more than 20% of melanoma patients  Our practice makes the following recommendations for follow-up for patients with invasive melanoma    • At-least "monthly" self-skin examinations   • Routine skin checks by a board certified dermatologist  • Follow-up intervals are "every 3 months" within 2 years of a new melanoma diagnosis; "every 6 months" between 2-4 years of a new melanoma diagnosis; and "annually" after 4 years of a new melanoma diagnosis  • Individual patient's needs should be considered before an appropriate follow-up is offered  • Provide education and support to help the patient adjust to their illness    Follow-up appointments should include:  • A check of the scar where the primary melanoma was removed  • Checking the regional lymph nodes  • A general skin examination  • A full physical examination at least annually by your primary care physician    In those with more advanced primary disease, follow-up may include:  • Blood tests  • Imaging: ultrasound, X-ray, CT, MRI and PET scan  Most tests are not worthwhile for patients with stage 1 or 2 melanoma unless there are signs or symptoms of disease recurrence or metastasis  No tests are necessary for healthy patients who have remained well for five years or longer after removal of their melanoma  What is the outlook for patients with melanoma? • Melanoma in situ is cured by excision because it has no potential to spread around the body  • The risk of spread and ultimate death from invasive melanoma depends on several factors, but the main one is the Breslow thickness of the melanoma at the time it was surgically removed  • Metastases are rare for melanomas < 0 75 mm and the risk for tumours 0 75-1 mm thick is about 5%  The risk steadily increases with thickness so that melanomas > 4 mm have a risk of metastasis of about 40%  Melanoma is a potentially serious type of skin cancer, in which there is uncontrolled growth of melanocytes (pigment cells)  Melanoma is sometimes called malignant melanoma  Normal melanocytes are found in the basal layer of the epidermis (the outer layer of skin)  Melanocytes produce a protein called melanin, which protects skin cells by absorbing ultraviolet (UV) radiation   Melanocytes are found in equal numbers in black and white skin, but melanocytes in black skin produce much more melanin  People with dark brown or black skin are very much less likely to be damaged by UV radiation than those with white skin  ACTINIC KERATOSIS    Physical Exam:  • Anatomic Location Affected:  Ears, cheeks, hands  • Morphological Description: Thin pink papule(s) with gritty scale       Assessment and Plan:  Based on a thorough discussion of this condition and the management approach to it (including a comprehensive discussion of the known risks, side effects and potential benefits of treatment), the patient (family) agrees to implement the following specific plan:  • Treated with cryotherapy today; written and verbal consent obtained     PROCEDURE:  DESTRUCTION OF PRE-MALIGNANT LESIONS  After a thorough discussion of treatment options and risk/benefits/alternatives (including but not limited to local pain, scarring, dyspigmentation, blistering, and possible superinfection), verbal and written consent were obtained and the aforementioned lesions were treated on with cryotherapy using liquid nitrogen x 2 cycles for 5-10 seconds  The patient tolerated the procedure well, and after-care instructions were provided  • TOTAL NUMBER of 8 pre-malignant lesions were treated today on the ANATOMIC LOCATION: left ear, left check, left hand, right ear  Patient instructions: Your pre-cancerous lesions (called actinic keratosis) were treated with liquid nitrogen today  The treated areas will get more red, crusted over the next few days  There might be some blistering  Apply vaseline to the treated area for the next week to help it heal fully  Do not pick at the area  Return in 3-4 weeks for another round of liquid nitrogen treatment if lesion(s)  fails to fully resolve          Scribe Attestation    I,:  Evelia Griffin am acting as a scribe while in the presence of the attending physician :       I,:  Willie Gonzalez MD personally performed the services described in this documentation as scribed in my presence :

## 2023-03-30 ENCOUNTER — APPOINTMENT (OUTPATIENT)
Dept: ORTHOPEDIC SURGERY | Facility: CLINIC | Age: 68
End: 2023-03-30
Payer: COMMERCIAL

## 2023-03-30 VITALS — HEIGHT: 72 IN | BODY MASS INDEX: 24.79 KG/M2 | WEIGHT: 183 LBS

## 2023-03-30 DIAGNOSIS — Z78.9 OTHER SPECIFIED HEALTH STATUS: ICD-10-CM

## 2023-03-30 PROCEDURE — 73562 X-RAY EXAM OF KNEE 3: CPT | Mod: 50

## 2023-03-30 PROCEDURE — 20610 DRAIN/INJ JOINT/BURSA W/O US: CPT | Mod: 50

## 2023-03-30 PROCEDURE — 99204 OFFICE O/P NEW MOD 45 MIN: CPT | Mod: 25

## 2023-03-30 NOTE — PROCEDURE
[Large Joint Injection] : Large joint injection [Bilateral] : bilaterally of the [Knee] : knee [Pain] : pain [X-ray evidence of Osteoarthritis on this or prior visit] : x-ray evidence of Osteoarthritis on this or prior visit [Betadine] : betadine [Ethyl Chloride sprayed topically] : ethyl chloride sprayed topically [___ cc    1%] : Lidocaine ~Vcc of 1%  [___ cc    40mg] : Methylprednisolone (Depomedrol) ~Vcc of 40 mg  [] : Patient tolerated procedure well [Call if redness, pain or fever occur] : call if redness, pain or fever occur [Apply ice for 15min out of every hour for the next 12-24 hours as tolerated] : apply ice for 15 minutes out of every hour for the next 12-24 hours as tolerated [Risks, benefits, alternatives discussed / Verbal consent obtained] : the risks benefits, and alternatives have been discussed, and verbal consent was obtained

## 2023-03-30 NOTE — HISTORY OF PRESENT ILLNESS
[5] : 5 [0] : 0 [Dull/Aching] : dull/aching [Sharp] : sharp [Walking] : walking [Intermittent] : intermittent [Full time] : Work status: full time [de-identified] : 3/30/23  Initial visit for this 68 year male c/o spon. onset of bilateral knee pain rt > lt x last 2 weeks duration. NO hx of trauma. Started yoga which may caused the pain. Worse walking and arising from a sitting position. Taking no nsaids.\par \par PMH: similar pain rt knee x 10 years ago, had cortisone injections before ski season and did well. [] : no [FreeTextEntry1] : bilateral knee pain [FreeTextEntry9] : no treatment [de-identified] : 2010 [de-identified] : dr Hawkins

## 2023-03-30 NOTE — PHYSICAL EXAM
[Well Developed] : well developed [Able to Communicate] : able to communicate [Well Nourished] : well nourished [NL (140)] : flexion 140 degrees [NL (0)] : extension 0 degrees [5___] : hamstring 5[unfilled]/5 [Bilateral] : knee bilaterally [AP] : anteroposterior [Lateral] : lateral [[place]] : skyline [de-identified] : average [] : non-antalgic [FreeTextEntry9] : Mild medial narrowing left, moderate medial narrowing right.  Mild PF both.

## 2023-06-13 ENCOUNTER — APPOINTMENT (OUTPATIENT)
Dept: OPHTHALMOLOGY | Facility: CLINIC | Age: 68
End: 2023-06-13

## 2023-08-15 ENCOUNTER — APPOINTMENT (OUTPATIENT)
Dept: OPHTHALMOLOGY | Facility: CLINIC | Age: 68
End: 2023-08-15

## 2023-08-15 ENCOUNTER — APPOINTMENT (OUTPATIENT)
Dept: OPHTHALMOLOGY | Facility: CLINIC | Age: 68
End: 2023-08-15
Payer: COMMERCIAL

## 2023-08-15 ENCOUNTER — NON-APPOINTMENT (OUTPATIENT)
Age: 68
End: 2023-08-15

## 2023-08-15 PROCEDURE — 92014 COMPRE OPH EXAM EST PT 1/>: CPT

## 2023-08-15 PROCEDURE — 92250 FUNDUS PHOTOGRAPHY W/I&R: CPT

## 2023-08-15 PROCEDURE — 92020 GONIOSCOPY: CPT

## 2023-08-15 PROCEDURE — 92083 EXTENDED VISUAL FIELD XM: CPT

## 2023-08-15 PROCEDURE — 76514 ECHO EXAM OF EYE THICKNESS: CPT

## 2024-05-07 ENCOUNTER — APPOINTMENT (OUTPATIENT)
Dept: ORTHOPEDIC SURGERY | Facility: CLINIC | Age: 69
End: 2024-05-07
Payer: COMMERCIAL

## 2024-05-07 DIAGNOSIS — M19.90 UNSPECIFIED OSTEOARTHRITIS, UNSPECIFIED SITE: ICD-10-CM

## 2024-05-07 DIAGNOSIS — E78.00 PURE HYPERCHOLESTEROLEMIA, UNSPECIFIED: ICD-10-CM

## 2024-05-07 DIAGNOSIS — M17.12 UNILATERAL PRIMARY OSTEOARTHRITIS, LEFT KNEE: ICD-10-CM

## 2024-05-07 DIAGNOSIS — M17.11 UNILATERAL PRIMARY OSTEOARTHRITIS, RIGHT KNEE: ICD-10-CM

## 2024-05-07 PROCEDURE — 20610 DRAIN/INJ JOINT/BURSA W/O US: CPT | Mod: 50

## 2024-05-07 RX ORDER — PANTOPRAZOLE 20 MG/1
20 TABLET, DELAYED RELEASE ORAL
Refills: 0 | Status: ACTIVE | COMMUNITY

## 2024-05-07 RX ORDER — ATORVASTATIN CALCIUM 80 MG/1
TABLET, FILM COATED ORAL
Refills: 0 | Status: ACTIVE | COMMUNITY

## 2024-05-07 RX ORDER — AMLODIPINE BESYLATE 5 MG/1
TABLET ORAL
Refills: 0 | Status: ACTIVE | COMMUNITY

## 2024-05-07 NOTE — HISTORY OF PRESENT ILLNESS
[7] : 7 [3] : 3 [Dull/Aching] : dull/aching [Sharp] : sharp [Constant] : constant [Rest] : rest [Walking] : walking [Stairs] : stairs [de-identified] : 05/07/24:  Returns today complaining or recurrent bilateral knee pain, left much worse than right, for the last month after snowboarding.  Did not have any injury.  Last cortisone injections over 13 months ago with good relief.  He leaves for vacation tomorrow and would like to repeat cortisone injection.   3/30/23  Initial visit for this 68 year male c/o spon. onset of bilateral knee pain rt > lt x last 2 weeks duration. NO hx of trauma. Started yoga which may caused the pain. Worse walking and arising from a sitting position. Taking no nsaids.  PMH: similar pain rt knee x 10 years ago, had cortisone injections before ski season and did well. [] : no [FreeTextEntry1] : both knees  [de-identified] : activity [de-identified] : 03/2023 [de-identified] : [facility] [de-identified] : x-ray

## 2024-05-07 NOTE — PHYSICAL EXAM
[Able to Communicate] : able to communicate [Well Developed] : well developed [Well Nourished] : well nourished [NL (140)] : flexion 140 degrees [NL (0)] : extension 0 degrees [5___] : hamstring 5[unfilled]/5 [Bilateral] : knee bilaterally [AP] : anteroposterior [Lateral] : lateral [[place]] : skyline [FreeTextEntry9] : Mild medial narrowing left, moderate medial narrowing right.  Mild PF both. [de-identified] : average [] : non-antalgic

## 2024-05-07 NOTE — PROCEDURE
[Large Joint Injection] : Large joint injection [Left] : of the left [Knee] : knee [Pain] : pain [Inflammation] : inflammation [X-ray evidence of Osteoarthritis on this or prior visit] : x-ray evidence of Osteoarthritis on this or prior visit [Betadine] : betadine [Ethyl Chloride sprayed topically] : ethyl chloride sprayed topically [___ cc    1%] : Lidocaine ~Vcc of 1%  [___ cc    40mg] : Methylprednisolone (Depomedrol) ~Vcc of 40 mg  [] : Patient tolerated procedure well [Call if redness, pain or fever occur] : call if redness, pain or fever occur [Apply ice for 15min out of every hour for the next 12-24 hours as tolerated] : apply ice for 15 minutes out of every hour for the next 12-24 hours as tolerated [Risks, benefits, alternatives discussed / Verbal consent obtained] : the risks benefits, and alternatives have been discussed, and verbal consent was obtained

## 2024-05-15 ENCOUNTER — INPATIENT (INPATIENT)
Facility: HOSPITAL | Age: 69
LOS: 4 days | Discharge: HOME | DRG: 917 | End: 2024-05-20
Attending: FAMILY MEDICINE | Admitting: FAMILY MEDICINE
Payer: MEDICARE

## 2024-05-15 VITALS
RESPIRATION RATE: 18 BRPM | WEIGHT: 169.98 LBS | TEMPERATURE: 98 F | SYSTOLIC BLOOD PRESSURE: 119 MMHG | HEART RATE: 68 BPM | DIASTOLIC BLOOD PRESSURE: 79 MMHG | HEIGHT: 71 IN | OXYGEN SATURATION: 96 %

## 2024-05-15 DIAGNOSIS — Z98.89 OTHER SPECIFIED POSTPROCEDURAL STATES: Chronic | ICD-10-CM

## 2024-05-15 LAB
ALBUMIN SERPL ELPH-MCNC: 4.1 G/DL — SIGNIFICANT CHANGE UP (ref 3.3–5)
ALP SERPL-CCNC: 105 U/L — SIGNIFICANT CHANGE UP (ref 30–120)
ALT FLD-CCNC: 42 U/L — SIGNIFICANT CHANGE UP (ref 10–60)
ANION GAP SERPL CALC-SCNC: 11 MMOL/L — SIGNIFICANT CHANGE UP (ref 5–17)
APAP SERPL-MCNC: <1 UG/ML — LOW (ref 10–30)
APTT BLD: 25.4 SEC — SIGNIFICANT CHANGE UP (ref 24.5–35.6)
AST SERPL-CCNC: 46 U/L — HIGH (ref 10–40)
BASOPHILS # BLD AUTO: 0.06 K/UL — SIGNIFICANT CHANGE UP (ref 0–0.2)
BASOPHILS NFR BLD AUTO: 1.2 % — SIGNIFICANT CHANGE UP (ref 0–2)
BILIRUB SERPL-MCNC: 0.7 MG/DL — SIGNIFICANT CHANGE UP (ref 0.2–1.2)
BUN SERPL-MCNC: 9 MG/DL — SIGNIFICANT CHANGE UP (ref 7–23)
CALCIUM SERPL-MCNC: 8.5 MG/DL — SIGNIFICANT CHANGE UP (ref 8.4–10.5)
CHLORIDE SERPL-SCNC: 103 MMOL/L — SIGNIFICANT CHANGE UP (ref 96–108)
CO2 SERPL-SCNC: 30 MMOL/L — SIGNIFICANT CHANGE UP (ref 22–31)
CREAT SERPL-MCNC: 0.91 MG/DL — SIGNIFICANT CHANGE UP (ref 0.5–1.3)
EGFR: 92 ML/MIN/1.73M2 — SIGNIFICANT CHANGE UP
EOSINOPHIL # BLD AUTO: 0.2 K/UL — SIGNIFICANT CHANGE UP (ref 0–0.5)
EOSINOPHIL NFR BLD AUTO: 3.8 % — SIGNIFICANT CHANGE UP (ref 0–6)
ETHANOL SERPL-MCNC: 200 MG/DL — HIGH (ref 0–3)
GLUCOSE SERPL-MCNC: 132 MG/DL — HIGH (ref 70–99)
HCT VFR BLD CALC: 48.3 % — SIGNIFICANT CHANGE UP (ref 39–50)
HGB BLD-MCNC: 16.9 G/DL — SIGNIFICANT CHANGE UP (ref 13–17)
IMM GRANULOCYTES NFR BLD AUTO: 0.8 % — SIGNIFICANT CHANGE UP (ref 0–0.9)
INR BLD: 0.96 RATIO — SIGNIFICANT CHANGE UP (ref 0.85–1.18)
LYMPHOCYTES # BLD AUTO: 1.28 K/UL — SIGNIFICANT CHANGE UP (ref 1–3.3)
LYMPHOCYTES # BLD AUTO: 24.6 % — SIGNIFICANT CHANGE UP (ref 13–44)
MAGNESIUM SERPL-MCNC: 1.9 MG/DL — SIGNIFICANT CHANGE UP (ref 1.6–2.6)
MCHC RBC-ENTMCNC: 30.1 PG — SIGNIFICANT CHANGE UP (ref 27–34)
MCHC RBC-ENTMCNC: 35 GM/DL — SIGNIFICANT CHANGE UP (ref 32–36)
MCV RBC AUTO: 86.1 FL — SIGNIFICANT CHANGE UP (ref 80–100)
MONOCYTES # BLD AUTO: 0.45 K/UL — SIGNIFICANT CHANGE UP (ref 0–0.9)
MONOCYTES NFR BLD AUTO: 8.7 % — SIGNIFICANT CHANGE UP (ref 2–14)
NEUTROPHILS # BLD AUTO: 3.17 K/UL — SIGNIFICANT CHANGE UP (ref 1.8–7.4)
NEUTROPHILS NFR BLD AUTO: 60.9 % — SIGNIFICANT CHANGE UP (ref 43–77)
NRBC # BLD: 0 /100 WBCS — SIGNIFICANT CHANGE UP (ref 0–0)
PHOSPHATE SERPL-MCNC: 3.2 MG/DL — SIGNIFICANT CHANGE UP (ref 2.5–4.5)
PLATELET # BLD AUTO: 348 K/UL — SIGNIFICANT CHANGE UP (ref 150–400)
POTASSIUM SERPL-MCNC: 3.3 MMOL/L — LOW (ref 3.5–5.3)
POTASSIUM SERPL-SCNC: 3.3 MMOL/L — LOW (ref 3.5–5.3)
PROT SERPL-MCNC: 7.8 G/DL — SIGNIFICANT CHANGE UP (ref 6–8.3)
PROTHROM AB SERPL-ACNC: 10.5 SEC — SIGNIFICANT CHANGE UP (ref 9.5–13)
RBC # BLD: 5.61 M/UL — SIGNIFICANT CHANGE UP (ref 4.2–5.8)
RBC # FLD: 12.7 % — SIGNIFICANT CHANGE UP (ref 10.3–14.5)
SALICYLATES SERPL-MCNC: <0.2 MG/DL — LOW (ref 2.8–20)
SODIUM SERPL-SCNC: 144 MMOL/L — SIGNIFICANT CHANGE UP (ref 135–145)
WBC # BLD: 5.2 K/UL — SIGNIFICANT CHANGE UP (ref 3.8–10.5)
WBC # FLD AUTO: 5.2 K/UL — SIGNIFICANT CHANGE UP (ref 3.8–10.5)

## 2024-05-15 PROCEDURE — 99285 EMERGENCY DEPT VISIT HI MDM: CPT

## 2024-05-15 PROCEDURE — 93010 ELECTROCARDIOGRAM REPORT: CPT

## 2024-05-15 PROCEDURE — 72125 CT NECK SPINE W/O DYE: CPT | Mod: 26,MC

## 2024-05-15 PROCEDURE — 74177 CT ABD & PELVIS W/CONTRAST: CPT | Mod: 26,MC

## 2024-05-15 PROCEDURE — 73060 X-RAY EXAM OF HUMERUS: CPT | Mod: 26,RT

## 2024-05-15 PROCEDURE — 71260 CT THORAX DX C+: CPT | Mod: 26,MC

## 2024-05-15 PROCEDURE — 70450 CT HEAD/BRAIN W/O DYE: CPT | Mod: 26,MC

## 2024-05-15 RX ORDER — SODIUM CHLORIDE 9 MG/ML
3 INJECTION INTRAMUSCULAR; INTRAVENOUS; SUBCUTANEOUS ONCE
Refills: 0 | Status: COMPLETED | OUTPATIENT
Start: 2024-05-15 | End: 2024-05-15

## 2024-05-15 RX ORDER — SODIUM CHLORIDE 9 MG/ML
1000 INJECTION INTRAMUSCULAR; INTRAVENOUS; SUBCUTANEOUS ONCE
Refills: 0 | Status: COMPLETED | OUTPATIENT
Start: 2024-05-15 | End: 2024-05-15

## 2024-05-15 RX ORDER — CHOLECALCIFEROL (VITAMIN D3) 125 MCG
0 CAPSULE ORAL
Qty: 0 | Refills: 0 | DISCHARGE

## 2024-05-15 RX ORDER — ATORVASTATIN CALCIUM 80 MG/1
20 TABLET, FILM COATED ORAL
Qty: 0 | Refills: 0 | DISCHARGE

## 2024-05-15 RX ORDER — ASPIRIN/CALCIUM CARB/MAGNESIUM 324 MG
1 TABLET ORAL
Qty: 0 | Refills: 0 | DISCHARGE

## 2024-05-15 RX ORDER — PANTOPRAZOLE SODIUM 20 MG/1
1 TABLET, DELAYED RELEASE ORAL
Qty: 0 | Refills: 0 | DISCHARGE

## 2024-05-15 RX ORDER — CLOPIDOGREL BISULFATE 75 MG/1
1 TABLET, FILM COATED ORAL
Qty: 0 | Refills: 0 | DISCHARGE

## 2024-05-15 RX ORDER — CLONAZEPAM 1 MG
1 TABLET ORAL
Qty: 0 | Refills: 0 | DISCHARGE

## 2024-05-15 RX ADMIN — SODIUM CHLORIDE 1000 MILLILITER(S): 9 INJECTION INTRAMUSCULAR; INTRAVENOUS; SUBCUTANEOUS at 20:30

## 2024-05-15 RX ADMIN — SODIUM CHLORIDE 500 MILLILITER(S): 9 INJECTION INTRAMUSCULAR; INTRAVENOUS; SUBCUTANEOUS at 19:20

## 2024-05-15 RX ADMIN — SODIUM CHLORIDE 3 MILLILITER(S): 9 INJECTION INTRAMUSCULAR; INTRAVENOUS; SUBCUTANEOUS at 19:20

## 2024-05-15 NOTE — ED ADULT NURSE NOTE - NSFALLHARMRISKINTERV_ED_ALL_ED
Assistance OOB with selected safe patient handling equipment if applicable/Assistance with ambulation/Communicate risk of Fall with Harm to all staff, patient, and family/Monitor gait and stability/Monitor for mental status changes and reorient to person, place, and time, as needed/Provide visual cue: red socks, yellow wristband, yellow gown, etc/Reinforce activity limits and safety measures with patient and family/Toileting schedule using arm’s reach rule for commode and bathroom/Use of alarms - bed, stretcher, chair and/or video monitoring/Bed in lowest position, wheels locked, appropriate side rails in place/Call bell, personal items and telephone in reach/Instruct patient to call for assistance before getting out of bed/chair/stretcher/Non-slip footwear applied when patient is off stretcher/[place] to call system/Physically safe environment - no spills, clutter or unnecessary equipment/Purposeful Proactive Rounding/Room/bathroom lighting operational, light cord in reach

## 2024-05-15 NOTE — ED ADULT NURSE NOTE - REASON FOR REFERRAL
pt found unresponsive at home by his wife. empty bottle of Klonopin and empty bottle of vodka near by

## 2024-05-15 NOTE — ED ADULT NURSE NOTE - OBJECTIVE STATEMENT
pt to ED; BIBA; wife in waiting room and reports pt with h/o anxiety, depression, htn, high cholesterol, GERD. pt has previous psych admissions in the past. wife found pt at home, on the floor, empty bottle of klonopin next to him and empty bottle of vodka in the area. pt also noted to have several superficial wounds to right wrist area. pt is lethargic. unable to answer questions at this time. placed on 1:1 observation for pt safety pt to ED; BIBA; wife in waiting room and reports pt with h/o anxiety, depression, htn, high cholesterol, GERD. pt has previous psych admissions in the past. wife found pt at home, on the floor, empty bottle of Klonopin next to him and empty bottle of vodka in the area. pt also noted to have several superficial wounds to right wrist area. pt also texted "goodbye" to his wife and kids.  pt is lethargic. unable to answer questions at this time. placed on 1:1 observation for pt safety

## 2024-05-15 NOTE — ED PROVIDER NOTE - PROGRESS NOTE DETAILS
wife in waiting room, told RN pt has done this multiple times, thinks his old med list might be right, but states he's not on a blood thinner currently, believes he's UTD on tetanus - has new cuts to wrists, there was a swiss army knife near him at the house pt easily woken at this time, aware that he is in [facility], asking how he got here from [facility] (was just in [facility]), pt able to give medical history, did not know why he was brought to the hospital, but when told about his suicide text to his wife, states he always tells her that when he's drinking, does not remember taking pills, does not remember cutting wrists wife in waiting room, told RN pt has done this multiple times, thinks his old med list might be right, but states he's not on a blood thinner currently, believes he's UTD on tetanus - has new cuts to wrists, there was a swiss army knife near him at the house; wife reports [place] and [place] and hit right arm, which is where the bruise was from Patient more awake although still appears drowsy.  Psychiatry recommending admission to medicine with plan for admission psychiatrically when medically cleared.  Hospitalist aware of case.

## 2024-05-15 NOTE — ED ADULT TRIAGE NOTE - CHIEF COMPLAINT QUOTE
found by wife 40 mins ago with an empty old bottle of klonopin and empty pint of vodka, also texted to say goodbye to family

## 2024-05-15 NOTE — ED PROVIDER NOTE - CARE PLAN
Principal Discharge DX:	Suicide attempt  Secondary Diagnosis:	Alcohol intoxication   1 Principal Discharge DX:	Suicide attempt  Secondary Diagnosis:	Alcohol intoxication  Secondary Diagnosis:	Acute UTI

## 2024-05-15 NOTE — ED PROVIDER NOTE - OBJECTIVE STATEMENT
69y M BIBEMS s/p overdose, pt texted his wife to say good bye to their kids for him, she found him unresponsive at home, reportedly was drinking alcohol and they found an empty bottle of klonopin, no other bottles found. Wife is reportedly coming to the ED, do not have current medication list at this time (ED chart showing in 2020 pt was on norvasc, plavix, lipitor...) pt lethargic, only answers name and that he drank alcohol, also has a bruise right arm and says he fell but unable to provide more information about this. Per EMS pt has h/o suicide attempts previously

## 2024-05-15 NOTE — ED PROVIDER NOTE - MDM ORDERS SUBMITTED SELECTION
Data: Dinora Squires transferred to [id number] via wheelchair at 0107. Baby transferred via parent's arms.  Action: Receiving unit notified of transfer: Yes. Patient and family notified of room change. Report given to Adrienne ZELAYA at 0108. Belongings sent to receiving unit. Accompanied by Registered Nurse. Oriented patient to surroundings. Call light within reach. ID bands double-checked with receiving RN.  Response: Patient tolerated transfer and is stable.    Patients mobililty level scored using the bedside mobility assistance tool (BMAT). Patient is at a mobility level test number: 4. Mobility equipment used: none required. Required assist of 0 staff members. Further use of BMAT scoring not required.  
Imaging Studies

## 2024-05-16 DIAGNOSIS — T14.91XA SUICIDE ATTEMPT, INITIAL ENCOUNTER: ICD-10-CM

## 2024-05-16 LAB
APPEARANCE UR: CLEAR — SIGNIFICANT CHANGE UP
BILIRUB UR-MCNC: NEGATIVE — SIGNIFICANT CHANGE UP
COLOR SPEC: YELLOW — SIGNIFICANT CHANGE UP
DIFF PNL FLD: NEGATIVE — SIGNIFICANT CHANGE UP
GLUCOSE UR QL: NEGATIVE MG/DL — SIGNIFICANT CHANGE UP
KETONES UR-MCNC: NEGATIVE MG/DL — SIGNIFICANT CHANGE UP
LEUKOCYTE ESTERASE UR-ACNC: ABNORMAL
NITRITE UR-MCNC: POSITIVE
OSMOLALITY SERPL: 346 MOSMOL/KG — HIGH (ref 280–301)
PCP SPEC-MCNC: SIGNIFICANT CHANGE UP
PH UR: 6.5 — SIGNIFICANT CHANGE UP (ref 5–8)
PROT UR-MCNC: SIGNIFICANT CHANGE UP MG/DL
SP GR SPEC: 1.06 — HIGH (ref 1–1.03)
UROBILINOGEN FLD QL: 1 MG/DL — SIGNIFICANT CHANGE UP (ref 0.2–1)

## 2024-05-16 PROCEDURE — 99221 1ST HOSP IP/OBS SF/LOW 40: CPT

## 2024-05-16 PROCEDURE — 99223 1ST HOSP IP/OBS HIGH 75: CPT

## 2024-05-16 RX ORDER — ONDANSETRON 8 MG/1
4 TABLET, FILM COATED ORAL ONCE
Refills: 0 | Status: COMPLETED | OUTPATIENT
Start: 2024-05-16 | End: 2024-05-16

## 2024-05-16 RX ORDER — LANOLIN ALCOHOL/MO/W.PET/CERES
3 CREAM (GRAM) TOPICAL AT BEDTIME
Refills: 0 | Status: DISCONTINUED | OUTPATIENT
Start: 2024-05-16 | End: 2024-05-20

## 2024-05-16 RX ORDER — AMLODIPINE BESYLATE 2.5 MG/1
10 TABLET ORAL DAILY
Refills: 0 | Status: DISCONTINUED | OUTPATIENT
Start: 2024-05-16 | End: 2024-05-20

## 2024-05-16 RX ORDER — SODIUM CHLORIDE 9 MG/ML
1000 INJECTION INTRAMUSCULAR; INTRAVENOUS; SUBCUTANEOUS ONCE
Refills: 0 | Status: COMPLETED | OUTPATIENT
Start: 2024-05-16 | End: 2024-05-16

## 2024-05-16 RX ORDER — ONDANSETRON 8 MG/1
4 TABLET, FILM COATED ORAL EVERY 8 HOURS
Refills: 0 | Status: DISCONTINUED | OUTPATIENT
Start: 2024-05-16 | End: 2024-05-20

## 2024-05-16 RX ORDER — ATORVASTATIN CALCIUM 80 MG/1
20 TABLET, FILM COATED ORAL AT BEDTIME
Refills: 0 | Status: DISCONTINUED | OUTPATIENT
Start: 2024-05-16 | End: 2024-05-20

## 2024-05-16 RX ORDER — CEFTRIAXONE 500 MG/1
1000 INJECTION, POWDER, FOR SOLUTION INTRAMUSCULAR; INTRAVENOUS ONCE
Refills: 0 | Status: COMPLETED | OUTPATIENT
Start: 2024-05-16 | End: 2024-05-16

## 2024-05-16 RX ORDER — PANTOPRAZOLE SODIUM 20 MG/1
40 TABLET, DELAYED RELEASE ORAL
Refills: 0 | Status: DISCONTINUED | OUTPATIENT
Start: 2024-05-16 | End: 2024-05-20

## 2024-05-16 RX ORDER — ACETAMINOPHEN 500 MG
1000 TABLET ORAL ONCE
Refills: 0 | Status: COMPLETED | OUTPATIENT
Start: 2024-05-16 | End: 2024-05-16

## 2024-05-16 RX ORDER — ACETAMINOPHEN 500 MG
650 TABLET ORAL EVERY 6 HOURS
Refills: 0 | Status: DISCONTINUED | OUTPATIENT
Start: 2024-05-16 | End: 2024-05-20

## 2024-05-16 RX ORDER — POTASSIUM CHLORIDE 20 MEQ
10 PACKET (EA) ORAL ONCE
Refills: 0 | Status: COMPLETED | OUTPATIENT
Start: 2024-05-16 | End: 2024-05-16

## 2024-05-16 RX ADMIN — Medication 100 MILLIEQUIVALENT(S): at 00:56

## 2024-05-16 RX ADMIN — CEFTRIAXONE 1000 MILLIGRAM(S): 500 INJECTION, POWDER, FOR SOLUTION INTRAMUSCULAR; INTRAVENOUS at 00:55

## 2024-05-16 RX ADMIN — ATORVASTATIN CALCIUM 20 MILLIGRAM(S): 80 TABLET, FILM COATED ORAL at 21:36

## 2024-05-16 RX ADMIN — SODIUM CHLORIDE 333.33 MILLILITER(S): 9 INJECTION INTRAMUSCULAR; INTRAVENOUS; SUBCUTANEOUS at 00:56

## 2024-05-16 RX ADMIN — SODIUM CHLORIDE 1000 MILLILITER(S): 9 INJECTION INTRAMUSCULAR; INTRAVENOUS; SUBCUTANEOUS at 04:06

## 2024-05-16 RX ADMIN — ONDANSETRON 4 MILLIGRAM(S): 8 TABLET, FILM COATED ORAL at 11:12

## 2024-05-16 RX ADMIN — Medication 400 MILLIGRAM(S): at 12:06

## 2024-05-16 RX ADMIN — Medication 10 MILLIEQUIVALENT(S): at 02:00

## 2024-05-16 RX ADMIN — CEFTRIAXONE 100 MILLIGRAM(S): 500 INJECTION, POWDER, FOR SOLUTION INTRAMUSCULAR; INTRAVENOUS at 00:37

## 2024-05-16 NOTE — BH CONSULTATION LIAISON ASSESSMENT NOTE - HPI (INCLUDE ILLNESS QUALITY, SEVERITY, DURATION, TIMING, CONTEXT, MODIFYING FACTORS, ASSOCIATED SIGNS AND SYMPTOMS)
Patient seen, evaluated and chart reviewed. Patient is a 68 y/o WMM, with multiple prior psychiatric hospitalizations, history of anxiety, depression, alcohol dependence, BIBEMS s/p overdose, patient texted his wife to say good bye to their kids for him, she found him unresponsive at home, reportedly was drinking alcohol and they found an empty bottle of klonopin, no other bottles found. Wife is reportedly coming to the ED, do not have current medication list at this time (ED chart showing in 2020 pt was on norvasc, plavix, lipitor...) pt lethargic, only answers name and that he drank alcohol, also has a bruise right arm and says he fell but unable to provide more information about this. Per EMS pt has h/o suicide attempts in the past. At this time patient is lethargic, has difficulty engaging. Patient's wife and his provider (Venus Garcia) want inpatient hospitalization at this time.

## 2024-05-16 NOTE — BH CONSULTATION LIAISON ASSESSMENT NOTE - NSBHCHARTREVIEWVS_PSY_A_CORE FT
Vital Signs Last 24 Hrs  T(C): 37.9 (16 May 2024 11:04), Max: 37.9 (16 May 2024 11:04)  T(F): 100.3 (16 May 2024 11:04), Max: 100.3 (16 May 2024 11:04)  HR: 87 (16 May 2024 11:04) (67 - 87)  BP: 145/74 (16 May 2024 11:04) (119/79 - 145/74)  BP(mean): --  RR: 15 (16 May 2024 11:04) (15 - 18)  SpO2: 94% (16 May 2024 11:04) (94% - 96%)    Parameters below as of 16 May 2024 11:04  Patient On (Oxygen Delivery Method): room air

## 2024-05-16 NOTE — BH CONSULTATION LIAISON ASSESSMENT NOTE - OTHER PAST PSYCHIATRIC HISTORY (INCLUDE DETAILS REGARDING ONSET, COURSE OF ILLNESS, INPATIENT/OUTPATIENT TREATMENT)
Around 18 hospitalizations  At least two prior suicide attempts, when he drank, took pills, [place]  He has tried lexapro, effexor, prozac, imipramine,

## 2024-05-16 NOTE — H&P ADULT - HISTORY OF PRESENT ILLNESS
Patient is a 70 yo M with PMH of suicidal ideation with prior attempt, alcohol and benzodiazepine abuse, anxiety and depression, who presents to ED by EMS after being found unresponsive at home. Patient is a poor historian, history obtained by ED records. Patient reportedly texted wife he wanted to kill himself, and was found unresponsive at home, with an empty bottle of klonopin found next to pt. He had reportedly been drinking alcohol that evening. He also reported a fall. Wife reports a swiss army knife was found next to patient at the house.      ED course: pt lethargic on arrival. He reportedly was only able to answer name and that he drank alcohol. CMP K 3.3otherwise unremarkable. INR 0.96, CBC unremarkable. UA shows positive nitriete, small LE, 20 WBC and moderate bacteria. APAP and aspirin levels neg, serum alcohol 200, urine drug screen presumed negative. ED noted cuts to wrists. On re-examination in morning patient was more rouasable and able to give medical history, says he always tells wife he is suicidal when drinking, doesn't remember taking pills. Psych recommends admission to medicine for medical clearance and then plan for transfer to inpatient psych when medically ready.         CT chest/abd/pelvis/head/neck no acute findings  UTI w 20 WBC, +nitrites  low grade fever  seen by Dr Edward, admit for medical with plan for psych transfer   awake and groggy, talking  Patient is a 68 yo M with PMH of suicidal ideation with prior attempt, alcohol and benzodiazepine abuse, anxiety and depression, who presents to ED by EMS after being found unresponsive at home. Patient is a poor historian, history obtained by ED records. Patient reportedly texted wife he wanted to kill himself, and was found unresponsive at home, with an empty bottle of klonopin found next to pt. He had reportedly been drinking alcohol that evening. He also reported a fall. Wife reports a swiss army knife was found next to patient at the house.      ED course: pt lethargic on arrival. He reportedly was only able to answer name and that he drank alcohol. CMP K 3.3otherwise unremarkable. INR 0.96, CBC unremarkable. UA shows positive nitriete, small LE, 20 WBC and moderate bacteria. APAP and aspirin levels neg, serum alcohol 200, urine drug screen presumed negative. ED noted cuts to wrists. On re-examination in morning patient was more rouasable and able to give medical history, says he always tells wife he is suicidal when drinking, doesn't remember taking pills. Patient developed low-grade fever. Psych recommends admission to medicine for medical clearance and then plan for transfer to inpatient psych when medically ready.

## 2024-05-16 NOTE — H&P ADULT - ASSESSMENT
Intentional overdose  -as per ED patient was found with an empty bottle of clonazepam  -serum alcohol 200 on arrival  -1:1 observation  -psych consulted by ED and following  -patient cannot leave AMA, will likely need transfer to inpatient psych after medically clear    Fever  -no leukocytosis  -cannot rule out evolving aspiration PNA at this time  -tylenol PRN  -monitor labs       Intentional overdose  -as per ED patient was found with an empty bottle of clonazepam  -serum alcohol 200 on arrival  -1:1 observation  -psych consulted by ED and following  -patient cannot leave AMA, will likely need transfer to inpatient psych after medically clear    Fever  -no leukocytosis  -cannot rule out evolving aspiration PNA at this time  -tylenol PRN  -monitor labs    HTN  -continue home amlodipine    GERD  -continue home pantoprazole    HLD  -continue home atorvastatin   Intentional overdose  -as per ED patient was found with an empty bottle of clonazepam  -serum alcohol 200 on arrival  -1:1 observation  -psych consulted by ED and following  -patient cannot leave AMA, will likely need transfer to inpatient psych after medically clear    Fever  -no leukocytosis  -cannot rule out evolving aspiration PNA at this time  -tylenol PRN  -monitor labs, repeat in AM    HTN  -continue home amlodipine    GERD  -continue home pantoprazole    HLD  -continue home atorvastatin

## 2024-05-16 NOTE — PATIENT PROFILE ADULT - FALL HARM RISK - HARM RISK INTERVENTIONS
Assistance with ambulation/Assistance OOB with selected safe patient handling equipment/Communicate Risk of Fall with Harm to all staff/Monitor for mental status changes/Monitor gait and stability/Reinforce activity limits and safety measures with patient and family/Tailored Fall Risk Interventions/Toileting schedule using arm’s reach rule for commode and bathroom/Use of alarms - bed, chair and/or voice tab/Visual Cue: Yellow wristband and red socks/Bed in lowest position, wheels locked, appropriate side rails in place/Call bell, personal items and telephone in reach/Instruct patient to call for assistance before getting out of bed or chair/Non-slip footwear when patient is out of bed/[place] to call system/Physically safe environment - no spills, clutter or unnecessary equipment/Purposeful Proactive Rounding/Room/bathroom lighting operational, light cord in reach

## 2024-05-16 NOTE — PATIENT PROFILE ADULT - FUNCTIONAL SCREEN CURRENT LEVEL: COMMUNICATION, MLM
Acute arthritis    CAD (coronary artery disease)    Diverticulosis    Dyslipidemia    Hiatal hernia    High cholesterol    Hypertension    Hypothyroidism    Rectocele    Stress incontinence in female 2 = difficulty understanding (not related to language barrier)

## 2024-05-16 NOTE — BH CONSULTATION LIAISON ASSESSMENT NOTE - NSCURPASTPSYDX_PSY_ALL_CORE
----- Message from Desiree Bell MD sent at 9/6/2018  3:18 PM CDT -----  Please mail normal pap letter     Normal pap letter mailed to patient   13-Apr-2022 22:30 Mood disorder/Alcohol/Substance Use disorders

## 2024-05-16 NOTE — H&P ADULT - NSHPPHYSICALEXAM_GEN_ALL_CORE
GENERAL Adult male, appearsh dishveled,no acute distress, appropriate behavior  EYES: sclera clear, no exudates, PERRLA  ENMT: moist mucous membranes, oropharynx clear without erythema, no exudates  NECK: supple, soft, no thyromegaly noted  LUNGS:  clear to auscultation,  no rales, wheezing or rhonchi appreciated  HEART: S1/S2, regular rate and rhythm, no murmurs noted, no lower extremity edema appreciated  GASTROINTESTINAL: abdomen is soft, nontender, nondistended, normoactive bowel sounds, no palpable masses  INTEGUMENT: good skin turgor, warm and consistent with low-grade fevers, dry and intact, no lesions appreciated  MUSCULOSKELETAL: no clubbing or cyanosis, no obvious deformity  NEUROLOGIC: somnolent but rousable, falls asleep during conversations  HEME/LYMPH:  no obvious ecchymosis or petechiae

## 2024-05-16 NOTE — H&P ADULT - NSHPREVIEWOFSYSTEMS_GEN_ALL_CORE
REVIEW OF SYSTEMS:  CONSTITUTIONAL: No fever, weight loss, or fatigue  EYES: No eye pain, visual disturbances, or discharge  ENMT:  No difficulty hearing, tinnitus, vertigo; No sinus or throat pain  NECK: No pain or stiffness  RESPIRATORY: No cough, wheezing, chills or hemoptysis; No shortness of breath  CARDIOVASCULAR: No chest pain, palpitations, dizziness, or leg swelling  GASTROINTESTINAL: No abdominal or epigastric pain. No nausea, vomiting, or hematemesis; No diarrhea or constipation. No melena or hematochezia.  GENITOURINARY: No dysuria, frequency, hematuria, or incontinence  NEUROLOGICAL: No headaches, memory loss, loss of strength, numbness, or tremors  SKIN: No itching, burning, rashes, or lesions   LYMPH NODES: No enlarged glands  ENDOCRINE: No heat or cold intolerance; No hair loss; No polydipsia or polyuria  MUSCULOSKELETAL: No joint pain or swelling; No muscle, back, or extremity pain  PSYCHIATRIC: No depression, anxiety, mood swings, or difficulty sleeping  HEME/LYMPH: No easy bruising, or bleeding gums  ALLERGY AND IMMUNOLOGIC: No hives or eczema REVIEW OF SYSTEMS:  CONSTITUTIONAL: Denies fever, weight loss, or fatigue  EYES: No eye pain, visual disturbances, or discharge  ENMT:  No difficulty hearing, tinnitus, vertigo; No sinus or throat pain  NECK: No pain or stiffness  RESPIRATORY: No cough, wheezing, chills or hemoptysis; No shortness of breath  CARDIOVASCULAR: No chest pain, palpitations, dizziness, or leg swelling  GASTROINTESTINAL: No abdominal or epigastric pain. No nausea, vomiting, or hematemesis; No diarrhea or constipation. No melena or hematochezia.  NEUROLOGICAL: No headaches, memory loss, loss of strength, numbness, or tremors  SKIN: No itching, burning, rashes, or lesions   LYMPH NODES: No enlarged glands  ENDOCRINE: No heat or cold intolerance; No hair loss; No polydipsia or polyuria  MUSCULOSKELETAL: No joint pain or swelling; No muscle, back, or extremity pain  PSYCHIATRIC: No depression, anxiety, mood swings, or difficulty sleeping  HEME/LYMPH: No easy bruising, or bleeding gums  ALLERGY AND IMMUNOLOGIC: No hives or eczema

## 2024-05-16 NOTE — BH CONSULTATION LIAISON ASSESSMENT NOTE - RISK ASSESSMENT
Risk factors include recent suicide attempt, prior suicide attempts, insomnia, alcohol abuse, anxiety, stress from pandemic, family, job, lack of engagement in therapy. Protective factors include lack of current suicide ideation, lack of psychosis, lack of access to firearms. Overall patient appears to be at acutely elevated risk of suicide.

## 2024-05-16 NOTE — BH CONSULTATION LIAISON ASSESSMENT NOTE - SUMMARY
69 MWM with long history of anxiety, multiple suicide attempts and hospitalizations, currently in monthly outpatient med management on klonopin 1mg PO BID PRN, now BIBEMS after patient took a handful of klonopin and cut his wrist with razor and knife in the setting of one week of increased etoh use and stress.

## 2024-05-16 NOTE — ED ADULT NURSE REASSESSMENT NOTE - NS ED NURSE REASSESS COMMENT FT1
Received pt from [facility], pt is sleeping at this time with equal chest rise. Pt is on a 1:1 hold, being watched closely. Unlabored breathing at this time. No distress noted at this time. MD evaluation in progress.

## 2024-05-16 NOTE — H&P ADULT - MLM HIDDEN
I have reviewed discharge instructions with the patient. The patient verbalized understanding. Patient armband removed and shredded.  Patient ambulated independently out of care area yes

## 2024-05-16 NOTE — BH CONSULTATION LIAISON ASSESSMENT NOTE - NSBHCHARTREVIEWLAB_PSY_A_CORE FT
16.9   5.20  )-----------( 348      ( 15 May 2024 19:16 )             48.3   05-15    144  |  103  |  9   ----------------------------<  132<H>  3.3<L>   |  30  |  0.91    Ca    8.5      15 May 2024 19:16  Phos  3.2     05-15  Mg     1.9     05-15    TPro  7.8  /  Alb  4.1  /  TBili  0.7  /  DBili  x   /  AST  46<H>  /  ALT  42  /  AlkPhos  105  05-15

## 2024-05-17 LAB
ALBUMIN SERPL ELPH-MCNC: 2.9 G/DL — LOW (ref 3.3–5)
ALP SERPL-CCNC: 90 U/L — SIGNIFICANT CHANGE UP (ref 30–120)
ALT FLD-CCNC: 28 U/L — SIGNIFICANT CHANGE UP (ref 10–60)
ANION GAP SERPL CALC-SCNC: 7 MMOL/L — SIGNIFICANT CHANGE UP (ref 5–17)
AST SERPL-CCNC: 27 U/L — SIGNIFICANT CHANGE UP (ref 10–40)
BILIRUB SERPL-MCNC: 0.6 MG/DL — SIGNIFICANT CHANGE UP (ref 0.2–1.2)
BUN SERPL-MCNC: 11 MG/DL — SIGNIFICANT CHANGE UP (ref 7–23)
CALCIUM SERPL-MCNC: 8 MG/DL — LOW (ref 8.4–10.5)
CHLORIDE SERPL-SCNC: 106 MMOL/L — SIGNIFICANT CHANGE UP (ref 96–108)
CO2 SERPL-SCNC: 29 MMOL/L — SIGNIFICANT CHANGE UP (ref 22–31)
CREAT SERPL-MCNC: 0.97 MG/DL — SIGNIFICANT CHANGE UP (ref 0.5–1.3)
EGFR: 85 ML/MIN/1.73M2 — SIGNIFICANT CHANGE UP
GLUCOSE SERPL-MCNC: 100 MG/DL — HIGH (ref 70–99)
HCT VFR BLD CALC: 41.2 % — SIGNIFICANT CHANGE UP (ref 39–50)
HGB BLD-MCNC: 13.9 G/DL — SIGNIFICANT CHANGE UP (ref 13–17)
MAGNESIUM SERPL-MCNC: 1.8 MG/DL — SIGNIFICANT CHANGE UP (ref 1.6–2.6)
MCHC RBC-ENTMCNC: 30.6 PG — SIGNIFICANT CHANGE UP (ref 27–34)
MCHC RBC-ENTMCNC: 33.7 GM/DL — SIGNIFICANT CHANGE UP (ref 32–36)
MCV RBC AUTO: 90.7 FL — SIGNIFICANT CHANGE UP (ref 80–100)
NRBC # BLD: 0 /100 WBCS — SIGNIFICANT CHANGE UP (ref 0–0)
PLATELET # BLD AUTO: 240 K/UL — SIGNIFICANT CHANGE UP (ref 150–400)
POTASSIUM SERPL-MCNC: 3.6 MMOL/L — SIGNIFICANT CHANGE UP (ref 3.5–5.3)
POTASSIUM SERPL-SCNC: 3.6 MMOL/L — SIGNIFICANT CHANGE UP (ref 3.5–5.3)
PROT SERPL-MCNC: 6.1 G/DL — SIGNIFICANT CHANGE UP (ref 6–8.3)
RBC # BLD: 4.54 M/UL — SIGNIFICANT CHANGE UP (ref 4.2–5.8)
RBC # FLD: 13.2 % — SIGNIFICANT CHANGE UP (ref 10.3–14.5)
SARS-COV-2 RNA SPEC QL NAA+PROBE: SIGNIFICANT CHANGE UP
SODIUM SERPL-SCNC: 142 MMOL/L — SIGNIFICANT CHANGE UP (ref 135–145)
WBC # BLD: 7.53 K/UL — SIGNIFICANT CHANGE UP (ref 3.8–10.5)
WBC # FLD AUTO: 7.53 K/UL — SIGNIFICANT CHANGE UP (ref 3.8–10.5)

## 2024-05-17 PROCEDURE — 99231 SBSQ HOSP IP/OBS SF/LOW 25: CPT

## 2024-05-17 PROCEDURE — 99232 SBSQ HOSP IP/OBS MODERATE 35: CPT

## 2024-05-17 RX ADMIN — Medication 1 TABLET(S): at 11:26

## 2024-05-17 RX ADMIN — PANTOPRAZOLE SODIUM 40 MILLIGRAM(S): 20 TABLET, DELAYED RELEASE ORAL at 05:41

## 2024-05-17 RX ADMIN — AMLODIPINE BESYLATE 10 MILLIGRAM(S): 2.5 TABLET ORAL at 05:41

## 2024-05-17 RX ADMIN — Medication 1 TABLET(S): at 17:04

## 2024-05-17 RX ADMIN — ATORVASTATIN CALCIUM 20 MILLIGRAM(S): 80 TABLET, FILM COATED ORAL at 22:40

## 2024-05-17 NOTE — PROGRESS NOTE ADULT - ASSESSMENT
Intentional overdose  -as per ED patient was found with an empty bottle of clonazepam  -serum alcohol 200 on arrival  -1:1 observation  -psych consulted by ED and following  -patient cannot leave AMA, will likely need transfer to inpatient psych after medically clear    Fever  -no leukocytosis  -cannot rule out evolving aspiration PNA at this time  -tylenol PRN  -monitor labs, repeat in AM    HTN  -continue home amlodipine    GERD  -continue home pantoprazole    HLD  -continue home atorvastatin   Intentional overdose  -as per ED patient was found with an empty bottle of clonazepam  -serum alcohol 200 on arrival  -1:1 observation  -psych consulted by ED and following  -medically stable for transfer to inpatient psych  -patient cannot leave AMA, will likely need transfer to inpatient psych     Complicated UTI  -low-grade fevers on arrival  -no leukocytosis  -UA shows positive nitrites, small LE, moderate bacteria present  -cannot rule out evolving aspiration PNA at this time  -tylenol PRN  -monitor labs, repeat in AM    HTN  -continue home amlodipine    GERD  -continue home pantoprazole    HLD  -continue home atorvastatin   Intentional overdose  -as per ED patient was found with an empty bottle of clonazepam  -serum alcohol 200 on arrival  -1:1 observation  -psych consulted by ED and following  -medically stable for transfer to inpatient psych  -patient cannot leave AMA, will likely need transfer to inpatient psych     Complicated UTI  -low-grade fevers on arrival  -no leukocytosis  -UA shows positive nitrites, small LE, moderate bacteria present. Urine culture pending  -Tylenol PRN  -will monitor     HTN  -continue home amlodipine    GERD  -continue home pantoprazole    HLD  -continue home atorvastatin   Intentional overdose  -as per ED patient was found with an empty bottle of clonazepam  -serum alcohol 200 on arrival  -1:1 observation  -psych consulted by ED and following  -medically stable for transfer to inpatient psych  -patient cannot leave AMA, will likely need transfer to inpatient psych     Complicated UTI  -low-grade fevers on arrival  -no leukocytosis  -UA shows positive nitrites, small LE, moderate bacteria present. Urine culture pending  -started on BactrimDS, 800mg PO BID x7 days  -Tylenol PRN  -will monitor     HTN  -continue home amlodipine    GERD  -continue home pantoprazole    HLD  -continue home atorvastatin

## 2024-05-17 NOTE — CARE COORDINATION ASSESSMENT. - NSCAREPROVIDERS_GEN_ALL_CORE_FT
CARE PROVIDERS:  Accepting Physician: Byron Easley  Administration: Gayla Munoz  Admitting: Byron Easley  Attending: Byron Easley  Consultant: Darrell Edward  Covering Team: Milton Lewis  ED Attending: Teresa Baez  ED Nurse: Savana La  Infection Control: Delia Melton  Nurse: Huma Zuluaga  Nurse: Efren Avalos  Ordered: Doctor, Unknown  Outpatient Provider: Jose Tello  Override: Efren Avalos  PCA/Nursing Assistant: Alma Garcia  PCA/Nursing Assistant: Clay Hairston  Physical Therapy: Georgiana Farr  Physical Therapy: Esme Liriano  Primary Team: Shelbie Avila  Primary Team: Byron Easley  Registered Dietitian: Veronica Anton  Respiratory Therapy: Michael Villa  : Nathan Zaidi  Team: [facility], Team  UR// Supp. Assoc.: Shira Gay
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
## 2024-05-17 NOTE — BH CONSULTATION LIAISON PROGRESS NOTE - NSBHFUPINTERVALHXFT_PSY_A_CORE
Patient seen, evaluated and chart reviewed. Patient presents with lucidity. He is able to walk better and engages in rationalization of the event that brought him to the hospital. He is currently medically cleared.

## 2024-05-17 NOTE — CARE COORDINATION ASSESSMENT. - NSPASTMEDSURGHISTORY_GEN_ALL_CORE_FT
PAST MEDICAL & SURGICAL HISTORY:  Suicidal behavior      Depression      Alcohol abuse      Anxiety      Hypertension      Hernia, inguinal      Benzodiazepine abuse      H/O neck surgery

## 2024-05-17 NOTE — CARE COORDINATION ASSESSMENT. - NS SW HEALTH CARE DECISION
Important Medication Changes:Flecainide as needed for atrial fibrillation    Further testing to be done:none    Next follow up visit: In office in 1 Year    HERE IS SOME IMPORTANT INFORMATION FOR OUR PATIENTS    If you need refills- call your pharmacist and they will contact our office.    If you have medical concerns call    [phone]. ([facility])                                                              [phone]. ([place])                                                                         If you have a question during office hours, or need to make an appointment call [phone] ([place]) or [phone]([place]). You can consider signing up for [employer], our popular online communication portal to schedule an appointment, ask for a refill, see your results, or message the staff about your concerns. The nurses will get back to you as soon as they can. Please allow 24-48 hours if it's an emergency call the office to discuss your symptoms with a triage nurse.    Go to: [email or web address]          
No

## 2024-05-17 NOTE — PROGRESS NOTE ADULT - SUBJECTIVE AND OBJECTIVE BOX
Patient is a 69y old  Male who presents with a chief complaint of     INTERVAL HPI/OVERNIGHT EVENTS:        REVIEW OF SYSTEMS:  CONSTITUTIONAL: No fever, weight loss, or fatigue  EYES: No eye pain, visual disturbances, or discharge  ENMT:  No difficulty hearing, tinnitus, vertigo; No sinus or throat pain  NECK: No pain or stiffness  RESPIRATORY: No cough, wheezing, chills or hemoptysis; No shortness of breath  CARDIOVASCULAR: No chest pain, palpitations, lightheadedness, or leg swelling  GASTROINTESTINAL: No abdominal or epigastric pain. No nausea, vomiting, or hematemesis; No diarrhea or constipation. No melena or hematochezia.  GENITOURINARY: No dysuria, frequency, hematuria, or incontinence  NEUROLOGICAL: No headaches, memory loss, vertigo, loss of strength, numbness, or tremors  SKIN: No itching, burning, rashes, or lesions   LYMPH NODES: No enlarged glands  ENDOCRINE: No heat or cold intolerance; No hair loss; No polydipsia or polyuria  MUSCULOSKELETAL: No joint pain or swelling; No muscle, back, or extremity pain  PSYCHIATRIC: No depression, anxiety, or mood swings  HEME/LYMPH: No easy bruising, or bleeding gums  ALLERGY AND IMMUNOLOGIC: No hives or eczema    PHYSICAL EXAM:  GENERAL: NAD, well-groomed, well-developed  HEAD:  Atraumatic, Normocephalic  EYES: EOMI, PERRLA, conjunctiva and sclera clear  ENMT: Moist mucous membranes, Good dentition, No lesions  NECK: Supple, No JVD appreciated  NERVOUS SYSTEM:  Alert & Oriented X3, Good concentration; All 4 extremities mobile, no gross sensory deficits.   CHEST/LUNG: Clear to auscultation bilaterally; No rales, rhonchi, wheezing, or rubs appreciated  HEART: Regular rate and rhythm; No murmurs, rubs, or gallops  ABDOMEN: Soft, Nontender, Nondistended  EXTREMITIES:  No clubbing, cyanosis, or edema appreciated  LYMPH: No lymphadenopathy noted  SKIN: No rashes or lesions appreciated    MEDICATIONS  (STANDING):  amLODIPine   Tablet 10 milliGRAM(s) Oral daily  atorvastatin 20 milliGRAM(s) Oral at bedtime  multivitamin 1 Tablet(s) Oral daily  pantoprazole    Tablet 40 milliGRAM(s) Oral before breakfast    MEDICATIONS  (PRN):  acetaminophen     Tablet .. 650 milliGRAM(s) Oral every 6 hours PRN Temp greater or equal to 38C (100.4F), Mild Pain (1 - 3)  aluminum hydroxide/magnesium hydroxide/simethicone Suspension 30 milliLiter(s) Oral every 4 hours PRN Dyspepsia  melatonin 3 milliGRAM(s) Oral at bedtime PRN Insomnia  ondansetron Injectable 4 milliGRAM(s) IV Push every 8 hours PRN Nausea and/or Vomiting      Allergies    No Known Allergies    Intolerances        Vital Signs Last 24 Hrs  T(C): 37.1 (17 May 2024 08:46), Max: 37.2 (16 May 2024 17:13)  T(F): 98.7 (17 May 2024 08:46), Max: 98.9 (16 May 2024 17:13)  HR: 72 (17 May 2024 08:46) (67 - 82)  BP: 123/73 (17 May 2024 08:46) (121/65 - 150/78)  BP(mean): --  RR: 18 (17 May 2024 08:46) (17 - 19)  SpO2: 90% (17 May 2024 08:46) (90% - 96%)    Parameters below as of 17 May 2024 04:42  Patient On (Oxygen Delivery Method): room air        LABS:                        13.9   7.53  )-----------( 240      ( 17 May 2024 06:00 )             41.2     17 May 2024 06:00    142    |  106    |  11     ----------------------------<  100    3.6     |  29     |  0.97     Ca    8.0        17 May 2024 06:00  Mg     1.8       17 May 2024 06:00    TPro  6.1    /  Alb  2.9    /  TBili  0.6    /  DBili  x      /  AST  27     /  ALT  28     /  AlkPhos  90     17 May 2024 06:00    PT/INR - ( 15 May 2024 19:16 )   PT: 10.5 sec;   INR: 0.96 ratio         PTT - ( 15 May 2024 19:16 )  PTT:25.4 sec  Urinalysis Basic - ( 17 May 2024 06:00 )    Color: x / Appearance: x / SG: x / pH: x  Gluc: 100 mg/dL / Ketone: x  / Bili: x / Urobili: x   Blood: x / Protein: x / Nitrite: x   Leuk Esterase: x / RBC: x / WBC x   Sq Epi: x / Non Sq Epi: x / Bacteria: x      CAPILLARY BLOOD GLUCOSE     Patient is a 69y old  Male who presents with a chief complaint of overdose    INTERVAL HPI/OVERNIGHT EVENTS:  Patient seen awake and laying in bed. He reports feeling well, denies complaints at this e      REVIEW OF SYSTEMS:  CONSTITUTIONAL: No fever, weight loss, or fatigue  EYES: No eye pain, visual disturbances, or discharge  ENMT:  No difficulty hearing, tinnitus, vertigo; No sinus or throat pain  NECK: No pain or stiffness  RESPIRATORY: No cough, wheezing, chills or hemoptysis; No shortness of breath  CARDIOVASCULAR: No chest pain, palpitations, lightheadedness, or leg swelling  GASTROINTESTINAL: No abdominal or epigastric pain. No nausea, vomiting, or hematemesis; No diarrhea or constipation. No melena or hematochezia.  GENITOURINARY: No dysuria, frequency, hematuria, or incontinence  NEUROLOGICAL: No headaches, memory loss, vertigo, loss of strength, numbness, or tremors  SKIN: No itching, burning, rashes, or lesions   LYMPH NODES: No enlarged glands  ENDOCRINE: No heat or cold intolerance; No hair loss; No polydipsia or polyuria  MUSCULOSKELETAL: No joint pain or swelling; No muscle, back, or extremity pain  PSYCHIATRIC: No depression, anxiety, or mood swings  HEME/LYMPH: No easy bruising, or bleeding gums  ALLERGY AND IMMUNOLOGIC: No hives or eczema    PHYSICAL EXAM:  GENERAL: NAD, well-groomed, well-developed  HEAD:  Atraumatic, Normocephalic  EYES: EOMI, PERRLA, conjunctiva and sclera clear  ENMT: Moist mucous membranes, Good dentition, No lesions  NECK: Supple, No JVD appreciated  NERVOUS SYSTEM:  Alert & Oriented X3, Good concentration; All 4 extremities mobile, no gross sensory deficits.   CHEST/LUNG: Clear to auscultation bilaterally; No rales, rhonchi, wheezing, or rubs appreciated  HEART: Regular rate and rhythm; No murmurs, rubs, or gallops  ABDOMEN: Soft, Nontender, Nondistended  EXTREMITIES:  No clubbing, cyanosis, or edema appreciated  LYMPH: No lymphadenopathy noted  SKIN: No rashes or lesions appreciated    MEDICATIONS  (STANDING):  amLODIPine   Tablet 10 milliGRAM(s) Oral daily  atorvastatin 20 milliGRAM(s) Oral at bedtime  multivitamin 1 Tablet(s) Oral daily  pantoprazole    Tablet 40 milliGRAM(s) Oral before breakfast    MEDICATIONS  (PRN):  acetaminophen     Tablet .. 650 milliGRAM(s) Oral every 6 hours PRN Temp greater or equal to 38C (100.4F), Mild Pain (1 - 3)  aluminum hydroxide/magnesium hydroxide/simethicone Suspension 30 milliLiter(s) Oral every 4 hours PRN Dyspepsia  melatonin 3 milliGRAM(s) Oral at bedtime PRN Insomnia  ondansetron Injectable 4 milliGRAM(s) IV Push every 8 hours PRN Nausea and/or Vomiting      Allergies    No Known Allergies    Intolerances        Vital Signs Last 24 Hrs  T(C): 37.1 (17 May 2024 08:46), Max: 37.2 (16 May 2024 17:13)  T(F): 98.7 (17 May 2024 08:46), Max: 98.9 (16 May 2024 17:13)  HR: 72 (17 May 2024 08:46) (67 - 82)  BP: 123/73 (17 May 2024 08:46) (121/65 - 150/78)  BP(mean): --  RR: 18 (17 May 2024 08:46) (17 - 19)  SpO2: 90% (17 May 2024 08:46) (90% - 96%)    Parameters below as of 17 May 2024 04:42  Patient On (Oxygen Delivery Method): room air        LABS:                        13.9   7.53  )-----------( 240      ( 17 May 2024 06:00 )             41.2     17 May 2024 06:00    142    |  106    |  11     ----------------------------<  100    3.6     |  29     |  0.97     Ca    8.0        17 May 2024 06:00  Mg     1.8       17 May 2024 06:00    TPro  6.1    /  Alb  2.9    /  TBili  0.6    /  DBili  x      /  AST  27     /  ALT  28     /  AlkPhos  90     17 May 2024 06:00    PT/INR - ( 15 May 2024 19:16 )   PT: 10.5 sec;   INR: 0.96 ratio         PTT - ( 15 May 2024 19:16 )  PTT:25.4 sec  Urinalysis Basic - ( 17 May 2024 06:00 )    Color: x / Appearance: x / SG: x / pH: x  Gluc: 100 mg/dL / Ketone: x  / Bili: x / Urobili: x   Blood: x / Protein: x / Nitrite: x   Leuk Esterase: x / RBC: x / WBC x   Sq Epi: x / Non Sq Epi: x / Bacteria: x      CAPILLARY BLOOD GLUCOSE     Patient is a 69y old  Male who presents with a chief complaint of overdose    INTERVAL HPI/OVERNIGHT EVENTS:  Patient seen awake and laying in bed. He reports feeling well, denies complaints at this time. Denies dysuria or hematuria. Aide at bedside reports no events this morning.       REVIEW OF SYSTEMS:  CONSTITUTIONAL: No fever, weight loss, or fatigue  EYES: No eye pain, visual disturbances, or discharge  ENMT:  No difficulty hearing, tinnitus, vertigo; No sinus or throat pain  NECK: No pain or stiffness  RESPIRATORY: No cough, wheezing, chills or hemoptysis; No shortness of breath  CARDIOVASCULAR: No chest pain, palpitations, lightheadedness, or leg swelling  GASTROINTESTINAL: No abdominal or epigastric pain. No nausea, vomiting, diarrhea   GENITOURINARY: No dysuria, frequency, or hematuria  NEUROLOGICAL: No headaches, memory loss, vertigo, loss of strength, numbness, or tremors  SKIN: No itching, burning, rashes, or lesions   MUSCULOSKELETAL: No joint pain or swelling; No muscle, back, or extremity pain      PHYSICAL EXAM:  GENERAL: NAD, well-groomed, well-developed  HEAD:  Atraumatic, Normocephalic  EYES: EOMI, PERRLA, conjunctiva and sclera clear  ENMT: Moist mucous membranes, Good dentition, No lesions  NECK: Supple, No JVD appreciated  NERVOUS SYSTEM:  Alert & Oriented X3, Good concentration; All 4 extremities mobile, no gross sensory deficits.   CHEST/LUNG: Clear to auscultation bilaterally; No rales, rhonchi, wheezing, or rubs appreciated  HEART: Regular rate and rhythm; No murmurs, rubs, or gallops  ABDOMEN: Soft, Nontender, Nondistended  EXTREMITIES:  No clubbing, cyanosis, or edema appreciated  LYMPH: No lymphadenopathy noted  SKIN: No rashes or lesions appreciated    MEDICATIONS  (STANDING):  amLODIPine   Tablet 10 milliGRAM(s) Oral daily  atorvastatin 20 milliGRAM(s) Oral at bedtime  multivitamin 1 Tablet(s) Oral daily  pantoprazole    Tablet 40 milliGRAM(s) Oral before breakfast    MEDICATIONS  (PRN):  acetaminophen     Tablet .. 650 milliGRAM(s) Oral every 6 hours PRN Temp greater or equal to 38C (100.4F), Mild Pain (1 - 3)  aluminum hydroxide/magnesium hydroxide/simethicone Suspension 30 milliLiter(s) Oral every 4 hours PRN Dyspepsia  melatonin 3 milliGRAM(s) Oral at bedtime PRN Insomnia  ondansetron Injectable 4 milliGRAM(s) IV Push every 8 hours PRN Nausea and/or Vomiting      Allergies    No Known Allergies    Intolerances        Vital Signs Last 24 Hrs  T(C): 37.1 (17 May 2024 08:46), Max: 37.2 (16 May 2024 17:13)  T(F): 98.7 (17 May 2024 08:46), Max: 98.9 (16 May 2024 17:13)  HR: 72 (17 May 2024 08:46) (67 - 82)  BP: 123/73 (17 May 2024 08:46) (121/65 - 150/78)  BP(mean): --  RR: 18 (17 May 2024 08:46) (17 - 19)  SpO2: 90% (17 May 2024 08:46) (90% - 96%)    Parameters below as of 17 May 2024 04:42  Patient On (Oxygen Delivery Method): room air        LABS:                        13.9   7.53  )-----------( 240      ( 17 May 2024 06:00 )             41.2     17 May 2024 06:00    142    |  106    |  11     ----------------------------<  100    3.6     |  29     |  0.97     Ca    8.0        17 May 2024 06:00  Mg     1.8       17 May 2024 06:00    TPro  6.1    /  Alb  2.9    /  TBili  0.6    /  DBili  x      /  AST  27     /  ALT  28     /  AlkPhos  90     17 May 2024 06:00    PT/INR - ( 15 May 2024 19:16 )   PT: 10.5 sec;   INR: 0.96 ratio         PTT - ( 15 May 2024 19:16 )  PTT:25.4 sec  Urinalysis Basic - ( 17 May 2024 06:00 )    Color: x / Appearance: x / SG: x / pH: x  Gluc: 100 mg/dL / Ketone: x  / Bili: x / Urobili: x   Blood: x / Protein: x / Nitrite: x   Leuk Esterase: x / RBC: x / WBC x   Sq Epi: x / Non Sq Epi: x / Bacteria: x      CAPILLARY BLOOD GLUCOSE

## 2024-05-18 PROCEDURE — 99233 SBSQ HOSP IP/OBS HIGH 50: CPT

## 2024-05-18 RX ORDER — TRAZODONE HCL 50 MG
50 TABLET ORAL AT BEDTIME
Refills: 0 | Status: DISCONTINUED | OUTPATIENT
Start: 2024-05-18 | End: 2024-05-20

## 2024-05-18 RX ADMIN — Medication 2 MILLIGRAM(S): at 11:11

## 2024-05-18 RX ADMIN — Medication 1 TABLET(S): at 17:15

## 2024-05-18 RX ADMIN — AMLODIPINE BESYLATE 10 MILLIGRAM(S): 2.5 TABLET ORAL at 05:26

## 2024-05-18 RX ADMIN — Medication 1 TABLET(S): at 11:11

## 2024-05-18 RX ADMIN — Medication 3 MILLIGRAM(S): at 22:17

## 2024-05-18 RX ADMIN — PANTOPRAZOLE SODIUM 40 MILLIGRAM(S): 20 TABLET, DELAYED RELEASE ORAL at 05:26

## 2024-05-18 RX ADMIN — ATORVASTATIN CALCIUM 20 MILLIGRAM(S): 80 TABLET, FILM COATED ORAL at 22:18

## 2024-05-18 RX ADMIN — Medication 2 MILLIGRAM(S): at 17:15

## 2024-05-18 RX ADMIN — Medication 1 TABLET(S): at 05:26

## 2024-05-18 NOTE — PROGRESS NOTE ADULT - SUBJECTIVE AND OBJECTIVE BOX
Patient is a 69y old  Male who presents with a chief complaint of     INTERVAL HPI/OVERNIGHT EVENTS:  Patient seen awake and laying in bed. He reports feeling well, denies complaints at this time. Denies dysuria or hematuria. Patient reports staying awake overnight, nurse reports no overnight events. Nurse reports patient was mildly tremulous this AM.        REVIEW OF SYSTEMS:  CONSTITUTIONAL: No fever, weight loss, or fatigue  EYES: No eye pain, visual disturbances, or discharge  ENMT:  No difficulty hearing, tinnitus, vertigo; No sinus or throat pain  NECK: No pain or stiffness  RESPIRATORY: No cough, wheezing, chills or hemoptysis; No shortness of breath  CARDIOVASCULAR: No chest pain, palpitations, lightheadedness, or leg swelling  GASTROINTESTINAL: No abdominal or epigastric pain. No nausea, vomiting, diarrhea   GENITOURINARY: No dysuria, frequency, or hematuria  NEUROLOGICAL: No headaches, memory loss, vertigo, loss of strength, numbness, or tremors  SKIN: No itching, burning, rashes, or lesions   MUSCULOSKELETAL: No joint pain or swelling; No muscle, back, or extremity pain      PHYSICAL EXAM:  GENERAL: NAD, well-groomed, well-developed  HEAD:  Atraumatic, Normocephalic  EYES: EOMI, PERRLA, conjunctiva and sclera clear  ENMT: Moist mucous membranes, Good dentition, No lesions  NECK: Supple, No JVD appreciated  NERVOUS SYSTEM:  Alert & Oriented X3, Good concentration; All 4 extremities mobile, no gross sensory deficits.   CHEST/LUNG: Clear to auscultation bilaterally; No rales, rhonchi, wheezing, or rubs appreciated  HEART: Regular rate and rhythm; No murmurs, rubs, or gallops  ABDOMEN: Soft, Nontender, Nondistended  EXTREMITIES:  No clubbing, cyanosis, or edema appreciated  LYMPH: No lymphadenopathy noted  SKIN: No rashes or lesions appreciated        MEDICATIONS  (STANDING):  amLODIPine   Tablet 10 milliGRAM(s) Oral daily  atorvastatin 20 milliGRAM(s) Oral at bedtime  LORazepam     Tablet 2 milliGRAM(s) Oral every 6 hours  LORazepam     Tablet   Oral   multivitamin 1 Tablet(s) Oral daily  pantoprazole    Tablet 40 milliGRAM(s) Oral before breakfast  trimethoprim  160 mG/sulfamethoxazole 800 mG 1 Tablet(s) Oral two times a day    MEDICATIONS  (PRN):  acetaminophen     Tablet .. 650 milliGRAM(s) Oral every 6 hours PRN Temp greater or equal to 38C (100.4F), Mild Pain (1 - 3)  aluminum hydroxide/magnesium hydroxide/simethicone Suspension 30 milliLiter(s) Oral every 4 hours PRN Dyspepsia  melatonin 3 milliGRAM(s) Oral at bedtime PRN Insomnia  ondansetron Injectable 4 milliGRAM(s) IV Push every 8 hours PRN Nausea and/or Vomiting      Allergies    No Known Allergies    Intolerances        Vital Signs Last 24 Hrs  T(C): 37 (18 May 2024 14:15), Max: 37.4 (18 May 2024 04:40)  T(F): 98.6 (18 May 2024 14:15), Max: 99.3 (18 May 2024 04:40)  HR: 85 (18 May 2024 14:15) (85 - 102)  BP: 114/71 (18 May 2024 14:15) (114/71 - 132/72)  BP(mean): 90 (17 May 2024 22:44) (90 - 90)  RR: 18 (18 May 2024 14:15) (18 - 18)  SpO2: 93% (18 May 2024 14:15) (88% - 95%)    Parameters below as of 18 May 2024 14:15  Patient On (Oxygen Delivery Method): room air        LABS:      Ca    8.0        17 May 2024 06:00        Urinalysis Basic - ( 17 May 2024 06:00 )    Color: x / Appearance: x / SG: x / pH: x  Gluc: 100 mg/dL / Ketone: x  / Bili: x / Urobili: x   Blood: x / Protein: x / Nitrite: x   Leuk Esterase: x / RBC: x / WBC x   Sq Epi: x / Non Sq Epi: x / Bacteria: x      CAPILLARY BLOOD GLUCOSE

## 2024-05-18 NOTE — SOCIAL WORK PROGRESS NOTE - NSSWPROGRESSNOTE_GEN_ALL_CORE
SW met with the pt who reported that he began to experience social anxiety at age 14 when his mother was diagnosed with cancer. He has been using xanax and valium in the past and sees psychiatrist Dr Ilana Garcia. He admits to drinking 1-8 drinks a wee and has had 18 inpt psych stays, including [place] in [place] which he found to be very effective. Per RN, pt is beginning to tremor and will be starting on ativan today. SW following.

## 2024-05-18 NOTE — PROGRESS NOTE ADULT - ASSESSMENT
Intentional overdose  -as per ED patient was found with an empty bottle of clonazepam  -serum alcohol 200 on arrival  -1:1 observation  -psych consulted by ED and following  -medically stable for transfer to inpatient psych  -patient cannot leave AMA, will likely need transfer to inpatient psych     Complicated UTI  -low-grade fevers on arrival  -no leukocytosis  -UA shows positive nitrites, small LE, moderate bacteria present. Urine culture pending  -started on BactrimDS, 800mg PO BID x7 days  -Tylenol PRN  -will monitor     HTN  -continue home amlodipine    GERD  -continue home pantoprazole    HLD  -continue home atorvastatin   Intentional overdose  -as per ED patient was found with an empty bottle of clonazepam  -serum alcohol 200 on arrival. Patient reports he does not drink daily and is a social drinker for anxiety at events  -1:1 observation  -psych consulted by ED and following  -medically stable for transfer to inpatient psych  -patient cannot leave AMA, will likely need transfer to inpatient psych     Complicated UTI  -low-grade fevers on arrival  -no leukocytosis  -UA shows positive nitrites, small LE, moderate bacteria present. Urine culture pending  -started on BactrimDS, 800mg PO BID x7 days  -Tylenol PRN  -will monitor     HTN  -continue home amlodipine    GERD  -continue home pantoprazole    HLD  -continue home atorvastatin   Intentional overdose  -as per ED patient was found with an empty bottle of clonazepam  -serum alcohol 200 on arrival.   -1:1 observation  -psych consulted by ED and following  -medically stable for transfer to inpatient psych  -patient cannot leave AMA, will likely need transfer to inpatient psych     Benzodiazepine withdrawal  - pt reports taking TID clonazepam  - Patient reports he does not drink daily and is a social drinker for anxiety at events  - start CIWA monitoring  - holding home clonazepam due to overdose, started ativan scheduled taper  - addiction medicine consult    Complicated UTI  -low-grade fevers on arrival  -no leukocytosis  -UA shows positive nitrites, small LE, moderate bacteria present. Urine culture pending  -started on BactrimDS, 800mg PO BID x7 days  -Tylenol PRN  -will monitor     HTN  -continue home amlodipine    GERD  -continue home pantoprazole    HLD  -continue home atorvastatin

## 2024-05-19 DIAGNOSIS — F10.920 ALCOHOL USE, UNSPECIFIED WITH INTOXICATION, UNCOMPLICATED: ICD-10-CM

## 2024-05-19 DIAGNOSIS — F40.10 SOCIAL PHOBIA, UNSPECIFIED: ICD-10-CM

## 2024-05-19 DIAGNOSIS — F41.0 PANIC DISORDER [EPISODIC PAROXYSMAL ANXIETY]: ICD-10-CM

## 2024-05-19 DIAGNOSIS — F32.9 MAJOR DEPRESSIVE DISORDER, SINGLE EPISODE, UNSPECIFIED: ICD-10-CM

## 2024-05-19 LAB
CULTURE RESULTS: ABNORMAL
SPECIMEN SOURCE: SIGNIFICANT CHANGE UP

## 2024-05-19 PROCEDURE — 99232 SBSQ HOSP IP/OBS MODERATE 35: CPT

## 2024-05-19 PROCEDURE — 99232 SBSQ HOSP IP/OBS MODERATE 35: CPT | Mod: 95

## 2024-05-19 RX ORDER — HALOPERIDOL DECANOATE 100 MG/ML
5 INJECTION INTRAMUSCULAR EVERY 6 HOURS
Refills: 0 | Status: DISCONTINUED | OUTPATIENT
Start: 2024-05-19 | End: 2024-05-20

## 2024-05-19 RX ORDER — DIPHENHYDRAMINE HCL 50 MG
50 CAPSULE ORAL EVERY 6 HOURS
Refills: 0 | Status: DISCONTINUED | OUTPATIENT
Start: 2024-05-19 | End: 2024-05-20

## 2024-05-19 RX ADMIN — Medication 1 TABLET(S): at 11:25

## 2024-05-19 RX ADMIN — Medication 2 MILLIGRAM(S): at 22:09

## 2024-05-19 RX ADMIN — PANTOPRAZOLE SODIUM 40 MILLIGRAM(S): 20 TABLET, DELAYED RELEASE ORAL at 05:31

## 2024-05-19 RX ADMIN — AMLODIPINE BESYLATE 10 MILLIGRAM(S): 2.5 TABLET ORAL at 11:25

## 2024-05-19 RX ADMIN — ATORVASTATIN CALCIUM 20 MILLIGRAM(S): 80 TABLET, FILM COATED ORAL at 22:09

## 2024-05-19 RX ADMIN — Medication 1 MILLIGRAM(S): at 05:31

## 2024-05-19 RX ADMIN — Medication 1 TABLET(S): at 05:29

## 2024-05-19 RX ADMIN — Medication 2 MILLIGRAM(S): at 00:22

## 2024-05-19 RX ADMIN — Medication 1 MILLIGRAM(S): at 11:24

## 2024-05-19 NOTE — BH CONSULTATION LIAISON PROGRESS NOTE - NSBHASSESSMENTFT_PSY_ALL_CORE
At this time, patient is considered a danger to himself and others and needs inpatient psychiatric hospitalization for medication management and symptoms stabilization.   Mr Carlisle is a 69 year old man with a history of Panic disorder ,  Social anxiety and Alcohol misuse , who was admitted to the medical floor for altered mental status . According to the medical team, patient was said to have been found unresponsive by his wife with an empty bottle of vodka an empty bottle of klonopin,. According to the medical team, patient has since been medically cleared and a 2PC has since been filled .   Patient appears to have been having very distressing anxiety symptoms and panic attacks coupled with ongoing feelings of unwell likely secondary to the UTI , causing him to become intoxicated with alcohol on the day of presentation. it does not appear that patient overdosed on Klonopin since the mediations were reportedly in the possession of his wife . In addition, it does not appears that patient is a chronic daily drinker of alcohol and is at low risk for alcohol withdrawal symptoms . It however seems that patient self medicated his symptoms of anxiety by becoming intoxicated with alcohol.   Patient reportedly sent a suicide message to his family , has a history of suicide attempts in the context of the distress caused by his anxiety and seems to have difficulty coping with symptoms with associated unpredictable impulse control.   At this time, patient is considered a danger to himself and others and needs inpatient psychiatric hospitalization for medication management and symptoms stabilization.   Mr Carlisle is a 69 year old man with a history of Panic disorder ,  Social anxiety and Alcohol misuse , who was admitted to the medical floor for altered mental status . According to the medical team, patient was said to have been found unresponsive by his wife with an empty bottle of vodka an empty bottle of klonopin,. According to the medical team, patient has since been medically cleared and a 2PC has since been filled .   Patient appears to have been having very distressing anxiety symptoms and panic attacks coupled with ongoing feelings of unwell likely secondary to the UTI , causing him to become intoxicated with alcohol on the day of presentation. it does not appear that patient overdosed on Klonopin since the mediations were reportedly in the possession of his wife . In addition, it does not appears that patient is a chronic daily drinker of alcohol and is at low risk for alcohol withdrawal symptoms . It however seems that patient self medicated his symptoms of anxiety by becoming intoxicated with alcohol.   Patient reportedly sent a suicide message to his family , has a history of suicide attempts in the context of the distress caused by his anxiety and seems to have difficulty coping with symptoms with associated unpredictable impulse control.   At this time, patient is considered a danger to himself and needs inpatient psychiatric hospitalization for medication management and symptoms stabilization.

## 2024-05-19 NOTE — BH CONSULTATION LIAISON PROGRESS NOTE - NSBHCONSULTRECOMMENDOTHER_PSY_A_CORE FT
1. We recommend starting Klonopin 0.5mg P.O BID   2. Consider the use of an antidepressant after discussion with patient   3. We recommend melatonin 5 –10 mg P.O bedtime to regulate sleep wake cycle   4. We recommend behavioral interventions, and environmental modifications to help patient's orientation and help her feel safe in addition to implementing delirium precautions as per nursing staff.   5. Patient will benefit from outpatient psychodynamic psychotherapy and CBT services upon discharge from the hospital   6. in the mean time, patient will continue to need inpatient psychiatric hospitalization for medication management and symptom stabilization   7. The  psychiatry team will follow  1. We recommend starting Klonopin 1mg P.O BID   2. Consider the use of an antidepressant after discussion with patient   3. We recommend melatonin 5 –10 mg P.O bedtime to regulate sleep wake cycle   4. We recommend behavioral interventions, and environmental modifications to help patient's orientation and help her feel safe in addition to implementing delirium precautions as per nursing staff.   5. Patient will benefit from outpatient psychodynamic psychotherapy and CBT services upon discharge from the hospital   6. In the mean time, patient will continue to need inpatient psychiatric hospitalization for medication management and symptom stabilization   7. The  psychiatry team will follow

## 2024-05-19 NOTE — BH CONSULTATION LIAISON PROGRESS NOTE - NSICDXBHSECONDARYDX_PSY_ALL_CORE
MDD (major depressive disorder)   F32.9   Social anxiety disorder   F40.10  Alcohol use with uncomplicated intoxication without use disorder   F10.920   Social anxiety disorder   F40.10  Alcohol use with uncomplicated intoxication without use disorder   F10.920  Panic disorder without agoraphobia with severe panic attacks   F41.0

## 2024-05-19 NOTE — PROGRESS NOTE ADULT - ASSESSMENT
Intentional overdose  -as per ED patient was found with an empty bottle of clonazepam  -serum alcohol 200 on arrival.   -continue 1:1 observation  -psych consulted by ED and following  -medically stable for transfer to inpatient psych  -patient cannot leave AMA, will likely need transfer to inpatient psych     Benzodiazepine withdrawal  - pt reports taking TID clonazepam at home  - Patient reports he does not drink daily and is a social drinker for anxiety at events  - start CIWA monitoring  - holding home clonazepam due to overdose, continue ativan scheduled taper  - addiction medicine consult    Complicated UTI  -low-grade fevers on arrival  -no leukocytosis  -UA shows positive nitrites, small LE, moderate bacteria present. Urine culture pending  -started on BactrimDS, 800mg PO BID x7 days  -Tylenol PRN  -will monitor     HTN  -continue home amlodipine    GERD  -continue home pantoprazole    HLD  -continue home atorvastatin

## 2024-05-19 NOTE — CONSULT NOTE ADULT - SUBJECTIVE AND OBJECTIVE BOX
Date/Time Patient Seen:  		  Referring MD:   Data Reviewed	       Patient is a 69y old  Male who presents with a chief complaint of     Subjective/HPI   68 yo M with PMH of suicidal ideation with prior attempt, alcohol and benzodiazepine abuse, anxiety and depression, who presents to ED by EMS after being found unresponsive at home. Patient is a poor historian, history obtained by ED records. Patient reportedly texted wife he wanted to kill himself, and was found unresponsive at home, with an empty bottle of klonopin found next to pt.  PAST MEDICAL & SURGICAL HISTORY:  Hypertension    Anxiety    Alcohol abuse    Depression    Suicidal behavior    Benzodiazepine abuse    Hernia, inguinal    H/O neck surgery    Patient History:    Past Medical, Past Surgical, and Family History:  PAST MEDICAL HISTORY:  Alcohol abuse     Anxiety     Benzodiazepine abuse     Depression     Hypertension     Suicidal behavior.     PAST SURGICAL HISTORY:  H/O neck surgery     Hernia, inguinal.     Social History:  · Substance use	Unable to obtain      Tobacco Screening:  · Core Measure Site	No  · Has the patient used tobacco in the past 30 days?	No     Risk Assessment:    Present on Admission:  Deep Venous Thrombosis	no   Pulmonary Embolus	no      HIV Screening:  · In accordance with [place] State law, we offer every patient who comes to our ED an HIV test. Would you like to be tested today?	Unable to answer due to medical condition/unresponsive/etc...      Hepatitis C Test Questions:  · In accordance with [place] State Law, we offer every patient a Hepatitis C test. Would you like to be tested today?	Unable to answer due to medical condition/unresponsive/etc...  · Reason for unable to answer	altered mental status        Medication list         MEDICATIONS  (STANDING):  amLODIPine   Tablet 10 milliGRAM(s) Oral daily  atorvastatin 20 milliGRAM(s) Oral at bedtime  LORazepam     Tablet 1 milliGRAM(s) Oral every 6 hours  LORazepam     Tablet   Oral   multivitamin 1 Tablet(s) Oral daily  pantoprazole    Tablet 40 milliGRAM(s) Oral before breakfast  trimethoprim  160 mG/sulfamethoxazole 800 mG 1 Tablet(s) Oral two times a day    MEDICATIONS  (PRN):  acetaminophen     Tablet .. 650 milliGRAM(s) Oral every 6 hours PRN Temp greater or equal to 38C (100.4F), Mild Pain (1 - 3)  aluminum hydroxide/magnesium hydroxide/simethicone Suspension 30 milliLiter(s) Oral every 4 hours PRN Dyspepsia  melatonin 3 milliGRAM(s) Oral at bedtime PRN Insomnia  ondansetron Injectable 4 milliGRAM(s) IV Push every 8 hours PRN Nausea and/or Vomiting  traZODone 50 milliGRAM(s) Oral at bedtime PRN insomnia         Vitals log        ICU Vital Signs Last 24 Hrs  T(C): 37.2 (19 May 2024 00:16), Max: 37.4 (18 May 2024 20:10)  T(F): 98.9 (19 May 2024 00:16), Max: 99.3 (18 May 2024 20:10)  HR: 88 (19 May 2024 00:16) (85 - 90)  BP: 118/71 (19 May 2024 00:16) (114/71 - 132/72)  BP(mean): --  ABP: --  ABP(mean): --  RR: 18 (19 May 2024 00:16) (18 - 18)  SpO2: 95% (19 May 2024 00:16) (88% - 95%)    O2 Parameters below as of 19 May 2024 00:16  Patient On (Oxygen Delivery Method): room air                 Input and Output:  I&O's Detail      Lab Data                        13.9   7.53  )-----------( 240      ( 17 May 2024 06:00 )             41.2     05-17    142  |  106  |  11  ----------------------------<  100<H>  3.6   |  29  |  0.97    Ca    8.0<L>      17 May 2024 06:00  Mg     1.8     05-17    TPro  6.1  /  Alb  2.9<L>  /  TBili  0.6  /  DBili  x   /  AST  27  /  ALT  28  /  AlkPhos  90  05-17            Review of Systems	      Objective     Physical Examination        Pertinent Lab findings & Imaging      Regalado:  NO   Adequate UO     I&O's Detail           Discussed with:     Cultures:	        Radiology    ACC: [id number] EXAM:  CT ABDOMEN AND PELVIS IC   ORDERED BY: TANA CONNOLLY     ACC: [id number] EXAM:  CT CHEST IC   ORDERED BY: TANA CONNOLLY     PROCEDURE DATE:  05/15/2024          INTERPRETATION:  CLINICAL INFORMATION: Trauma.    COMPARISON: CT abdomen and pelvis from December 17, 2022.    CONTRAST/COMPLICATIONS:  IV Contrast: Omnipaque 350  92 cc administered   8 cc discarded  Oral Contrast: NONE  Complications: None reported at time of study completion    PROCEDURE:  CT of the Chest, Abdomen and Pelvis was performed.  Imaging was performed through the chest in the arterial phase followed by   imaging of the abdomen and pelvis in the portal venous phase.  Sagittal and coronal reformats were performed.    FINDINGS:  CHEST:  LUNGS AND LARGE AIRWAYS: Patent central airways. Bibasilar subsegmental   atelectasis.  PLEURA: No pleural effusion.  VESSELS: Atherosclerotic changes of the aorta.  HEART: Heart size is normal. No pericardial effusion.  MEDIASTINUM AND JAYESH: No lymphadenopathy.  CHEST WALL AND LOWER NECK: Within normal limits.    ABDOMEN AND PELVIS:  LIVER: Within normal limits.  BILE DUCTS: Normal caliber.  GALLBLADDER: Cholecystectomy.  SPLEEN: Within normal limits.  PANCREAS: Within normal limits.  ADRENALS: Within normal limits.  KIDNEYS/URETERS: No hydronephrosis. Bilateral parapelvic cysts and   subcentimeter hypodense renal lesions, too small to characterize.    BLADDER: Within normal limits.  REPRODUCTIVE ORGANS: Prostate within normal limits.    BOWEL: No bowel obstruction. Appendix is normal.  PERITONEUM: No ascites.  VESSELS: Atherosclerotic changes.  RETROPERITONEUM/LYMPH NODES: No lymphadenopathy.  ABDOMINAL WALL: Within normal limits.  BONES: Degenerative changes. ACDF hardware    IMPRESSION:  No sequela of acute trauma in the chest, abdomen, or pelvis.    --- End of Report ---            MAYANK STEIN MD; Attending Radiologist  This document has been electronically signed. May 15 2024  8:55PM                           Date/Time Patient Seen:  		  Referring MD:   Data Reviewed	       Patient is a 69y old  Male who presents with a chief complaint of     Subjective/HPI   68 yo M with PMH of suicidal ideation with prior attempt, alcohol and benzodiazepine abuse, anxiety and depression, who presents to ED by EMS after being found unresponsive at home. Patient is a poor historian, history obtained by ED records. Patient reportedly texted wife he wanted to kill himself, and was found unresponsive at home, with an empty bottle of klonopin found next to pt.  PAST MEDICAL & SURGICAL HISTORY:  Hypertension    Anxiety    Alcohol abuse    Depression    Suicidal behavior    Benzodiazepine abuse    Hernia, inguinal    H/O neck surgery    Patient History:    Past Medical, Past Surgical, and Family History:  PAST MEDICAL HISTORY:  Alcohol abuse     Anxiety     Benzodiazepine abuse     Depression     Hypertension     Suicidal behavior.     PAST SURGICAL HISTORY:  H/O neck surgery     Hernia, inguinal.     Social History:  · Substance use	Unable to obtain      Tobacco Screening:  · Core Measure Site	No  · Has the patient used tobacco in the past 30 days?	No     Risk Assessment:    Present on Admission:  Deep Venous Thrombosis	no   Pulmonary Embolus	no      HIV Screening:  · In accordance with [place] law, we offer every patient who comes to our ED an HIV test. Would you like to be tested today?	Unable to answer due to medical condition/unresponsive/etc...      Hepatitis C Test Questions:  · In accordance with [place] Law, we offer every patient a Hepatitis C test. Would you like to be tested today?	Unable to answer due to medical condition/unresponsive/etc...  · Reason for unable to answer	altered mental status        Medication list         MEDICATIONS  (STANDING):  amLODIPine   Tablet 10 milliGRAM(s) Oral daily  atorvastatin 20 milliGRAM(s) Oral at bedtime  LORazepam     Tablet 1 milliGRAM(s) Oral every 6 hours  LORazepam     Tablet   Oral   multivitamin 1 Tablet(s) Oral daily  pantoprazole    Tablet 40 milliGRAM(s) Oral before breakfast  trimethoprim  160 mG/sulfamethoxazole 800 mG 1 Tablet(s) Oral two times a day    MEDICATIONS  (PRN):  acetaminophen     Tablet .. 650 milliGRAM(s) Oral every 6 hours PRN Temp greater or equal to 38C (100.4F), Mild Pain (1 - 3)  aluminum hydroxide/magnesium hydroxide/simethicone Suspension 30 milliLiter(s) Oral every 4 hours PRN Dyspepsia  melatonin 3 milliGRAM(s) Oral at bedtime PRN Insomnia  ondansetron Injectable 4 milliGRAM(s) IV Push every 8 hours PRN Nausea and/or Vomiting  traZODone 50 milliGRAM(s) Oral at bedtime PRN insomnia         Vitals log        ICU Vital Signs Last 24 Hrs  T(C): 37.2 (19 May 2024 00:16), Max: 37.4 (18 May 2024 20:10)  T(F): 98.9 (19 May 2024 00:16), Max: 99.3 (18 May 2024 20:10)  HR: 88 (19 May 2024 00:16) (85 - 90)  BP: 118/71 (19 May 2024 00:16) (114/71 - 132/72)  BP(mean): --  ABP: --  ABP(mean): --  RR: 18 (19 May 2024 00:16) (18 - 18)  SpO2: 95% (19 May 2024 00:16) (88% - 95%)    O2 Parameters below as of 19 May 2024 00:16  Patient On (Oxygen Delivery Method): room air                 Input and Output:  I&O's Detail      Lab Data                        13.9   7.53  )-----------( 240      ( 17 May 2024 06:00 )             41.2     05-17    142  |  106  |  11  ----------------------------<  100<H>  3.6   |  29  |  0.97    Ca    8.0<L>      17 May 2024 06:00  Mg     1.8     05-17    TPro  6.1  /  Alb  2.9<L>  /  TBili  0.6  /  DBili  x   /  AST  27  /  ALT  28  /  AlkPhos  90  05-17            Review of Systems	  ams  weakness      Objective     Physical Examination    heart s1s2  lung dec BS  head nc      Pertinent Lab findings & Imaging      Regalado:  NO   Adequate UO     I&O's Detail           Discussed with:     Cultures:	        Radiology    ACC: [id number] EXAM:  CT ABDOMEN AND PELVIS IC   ORDERED BY: TANA CONNOLLY     ACC: [id number] EXAM:  CT CHEST IC   ORDERED BY: TANA CONNOLLY     PROCEDURE DATE:  05/15/2024          INTERPRETATION:  CLINICAL INFORMATION: Trauma.    COMPARISON: CT abdomen and pelvis from December 17, 2022.    CONTRAST/COMPLICATIONS:  IV Contrast: Omnipaque 350  92 cc administered   8 cc discarded  Oral Contrast: NONE  Complications: None reported at time of study completion    PROCEDURE:  CT of the Chest, Abdomen and Pelvis was performed.  Imaging was performed through the chest in the arterial phase followed by   imaging of the abdomen and pelvis in the portal venous phase.  Sagittal and coronal reformats were performed.    FINDINGS:  CHEST:  LUNGS AND LARGE AIRWAYS: Patent central airways. Bibasilar subsegmental   atelectasis.  PLEURA: No pleural effusion.  VESSELS: Atherosclerotic changes of the aorta.  HEART: Heart size is normal. No pericardial effusion.  MEDIASTINUM AND JAYESH: No lymphadenopathy.  CHEST WALL AND LOWER NECK: Within normal limits.    ABDOMEN AND PELVIS:  LIVER: Within normal limits.  BILE DUCTS: Normal caliber.  GALLBLADDER: Cholecystectomy.  SPLEEN: Within normal limits.  PANCREAS: Within normal limits.  ADRENALS: Within normal limits.  KIDNEYS/URETERS: No hydronephrosis. Bilateral parapelvic cysts and   subcentimeter hypodense renal lesions, too small to characterize.    BLADDER: Within normal limits.  REPRODUCTIVE ORGANS: Prostate within normal limits.    BOWEL: No bowel obstruction. Appendix is normal.  PERITONEUM: No ascites.  VESSELS: Atherosclerotic changes.  RETROPERITONEUM/LYMPH NODES: No lymphadenopathy.  ABDOMINAL WALL: Within normal limits.  BONES: Degenerative changes. ACDF hardware    IMPRESSION:  No sequela of acute trauma in the chest, abdomen, or pelvis.    --- End of Report ---            MAYANK STEIN MD; Attending Radiologist  This document has been electronically signed. May 15 2024  8:55PM

## 2024-05-19 NOTE — BH CONSULTATION LIAISON PROGRESS NOTE - NSBHFUPINTERVALHXFT_PSY_A_CORE
Patient seen and interviewed , 1 to 1 at bedside  Patient seen and interviewed , 1 to 1 at bedside .    Collateral information was obtained by patient's wife Ms Liane Carlisle who joined the interview later . She reports that she is unsure where the information that patient overdosed on Klonopin came from because she had his pills in her possession since their trip from [place] . She reports that they had returned the night before . She reports that patient had an empty bottle of a 5th of Vodka  beside him when she found him and he had sent a text message to her saying good by to her and the rest of the family . She reports that patient had been feeling unwell towards the end of their trip to [place] and he also started work on the morning of that Wednesday . She reports that patient has significant anxiety and does not cope well with stress  and she believes that his feeling of unwell which they have now been told is likely because of a UTI , the fact that he was dehydrated and the anxiety he had about starting work likely made him overwhelmed and caused him to start drinking . She reports that he is known to drink alcohol to self medicate his anxiety however she confirms that patient doesn't drink alcohol all the time . She reports that she is concerned for his saftey and is afraid that he would actually end his life one of these days .     Patient and his wife were psychoeducated about the management of anxiety and depression and that the treatment includes benzodiazepines at times , however the use of SSRIs  and psychotherapy are vital for effective management .   Patient reports that he has tried multiple SSRIs and psychotherapy separately in the past and found them ineffective . he also inquired if SSRIs can be taken in combination with Klonopin and patient was informed that this is possible. patient's wife reports that the period of time he did the best was after a 6 week stay at a facility where he was receiving DBT.  Patient seen and interviewed , 1 to 1 at bedside .  Upon approach, patient was observed to be sitting at the side of his hospital bed , calm, cooperative , well oriented in time, place and person .  Patient reports that he has no fresh complaints but is concerned about his continued stay on the medical floor . patient reports that he did not overdose on Klonopin but he drank vodka because he was very overwhelmed with his anxiety . he reports that he felt that he was doing okay , had just returned from vacationing with his family in [facility], and went in to work on Wednesday . He reports that he has a history of social anxiety and also has significant panic attacks especially when he is stressed out causing him to drink the vodka on that day . he reports that he tends to drink a lot of alcohol when he is in social situations to prevent a panic attack. he reports that that he feels that he can be dramatic at times in his responses to his panic attacks and in this situation the sucide text he sent to his wife was dramatic and he didn't mean it . Patient denies being depressed prior to the overdose  . he reports that he is worried that he would loose his job if he is admitted to the hospital for a prolonged amount of time .   He reports that his psychiatrist currently prescribed Klonopin 1mg P.O BID  and he denies taking any SSRI and also denies being in psychotherapy.   He reports that he has been having panic attacks since the age of 14 years old. Patient reports that he has tried multiple SSRIs and psychotherapy separately in the past and found them ineffective .     Collateral information was obtained by patient's wife Ms Liane Carlisle who joined the interview later . She reports that she is unsure where the information that patient overdosed on Klonopin came from because she had his pills in her possession since their trip from [place] . She reports that they had returned the night before . She reports that patient had an empty bottle of a 5th of Vodka  beside him and a knife when she found him and he had sent a text message to her saying good by to her and the rest of the family . She reports that patient had been feeling unwell towards the end of their trip to [place] and he also started work on the morning of that Wednesday . She reports that patient has significant anxiety and does not cope well with stress  and she believes that his feeling of unwell which they have now been told is likely because of a UTI , the fact that he was dehydrated and the anxiety he had about starting work likely made him overwhelmed and caused him to start drinking . She reports that he is known to drink alcohol to self medicate his anxiety however she confirms that patient doesn't drink alcohol all the time . She reports that she is concerned for his safety and is afraid that he would actually end his life one of these days .     Patient and his wife were psychoeducated about the management of anxiety and depression and that the treatment includes benzodiazepines at times , however the use of SSRIs  and psychotherapy are vital for effective management . he also inquired if SSRIs can be taken in combination with Klonopin and patient was informed that this is possible. Patient's wife reports that the period of time he did the best was after a 6 week stay at a facility where he was receiving DBT.     As per medical team on call, patient has since been started on antibiotics for the UTI and is completing Ativan taper today for alcohol withdrawals .

## 2024-05-19 NOTE — BH CONSULTATION LIAISON PROGRESS NOTE - NSBHCHARTREVIEWLAB_PSY_A_CORE FT
Culture - Urine (05.17.24 @ 14:50)    Specimen Source: Clean Catch Clean Catch (Midstream)    Culture Results:   >100,000 CFU/ml Staphylococcus epidermidis "Susceptibilities not  performed"    Comprehensive Metabolic Panel in AM (05.17.24 @ 06:00)    Sodium: 142 mmol/L    Potassium: 3.6 mmol/L    Chloride: 106 mmol/L    Carbon Dioxide: 29 mmol/L    Anion Gap: 7 mmol/L    Blood Urea Nitrogen: 11 mg/dL    Creatinine: 0.97 mg/dL    Glucose: 100 mg/dL    Calcium: 8.0 mg/dL    Protein Total: 6.1 g/dL    Albumin: 2.9 g/dL    Bilirubin Total: 0.6 mg/dL    Alkaline Phosphatase: 90 U/L    Aspartate Aminotransferase (AST/SGOT): 27 U/L    Alanine Aminotransferase (ALT/SGPT): 28 U/L  Complete Blood Count in AM (05.17.24 @ 06:00)    Nucleated RBC: 0 /100 WBCs    WBC Count: 7.53 K/uL    RBC Count: 4.54 M/uL    Hemoglobin: 13.9 g/dL    Hematocrit: 41.2 %    Mean Cell Volume: 90.7 fl    Mean Cell Hemoglobin: 30.6 pg    Mean Cell Hemoglobin Conc: 33.7 gm/dL    Red Cell Distrib Width: 13.2 %    Platelet Count - Automated: 240 K/uL  
                      13.9   7.53  )-----------( 240      ( 17 May 2024 06:00 )          05-17    142  |  106  |  11  ----------------------------<  100<H>  3.6   |  29  |  0.97    Ca    8.0<L>      17 May 2024 06:00  Phos  3.2     05-15  Mg     1.8     05-17    TPro  6.1  /  Alb  2.9<L>  /  TBili  0.6  /  DBili  x   /  AST  27  /  ALT  28  /  AlkPhos  90  05-17     41.2

## 2024-05-19 NOTE — PROGRESS NOTE ADULT - SUBJECTIVE AND OBJECTIVE BOX
Patient is a 69y old  Male who presents with a chief complaint of     INTERVAL HPI/OVERNIGHT EVENTS:  Patient seen awake and reading in bed. He reports feeling well, denies complaints at this time. Denies dysuria or hematuria. Patient reports staying awake overnight, nurse reports no overnight events.       REVIEW OF SYSTEMS:  CONSTITUTIONAL: No fever, weight loss, or fatigue  EYES: No eye pain, visual disturbances, or discharge  ENMT:  No difficulty hearing, tinnitus, vertigo; No sinus or throat pain  NECK: No pain or stiffness  RESPIRATORY: No cough, wheezing, chills or hemoptysis; No shortness of breath  CARDIOVASCULAR: No chest pain, palpitations, lightheadedness, or leg swelling  GASTROINTESTINAL: No abdominal or epigastric pain. No nausea, vomiting, diarrhea   GENITOURINARY: No dysuria, frequency, or hematuria  NEUROLOGICAL: No headaches, memory loss, vertigo, loss of strength, numbness, or tremors  SKIN: No itching, burning, rashes, or lesions   MUSCULOSKELETAL: No joint pain or swelling; No muscle, back, or extremity pain      PHYSICAL EXAM:  GENERAL: NAD, well-groomed, well-developed  HEAD:  Atraumatic, Normocephalic  EYES: EOMI, PERRLA, conjunctiva and sclera clear  ENMT: Moist mucous membranes, Good dentition, No lesions  NECK: Supple, No JVD appreciated  NERVOUS SYSTEM:  Alert & Oriented X3, Good concentration; All 4 extremities mobile, no gross sensory deficits.   CHEST/LUNG: Clear to auscultation bilaterally; No rales, rhonchi, wheezing, or rubs appreciated  HEART: Regular rate and rhythm; No murmurs, rubs, or gallops  ABDOMEN: Soft, Nontender, Nondistended  EXTREMITIES:  No clubbing, cyanosis, or edema appreciated  LYMPH: No lymphadenopathy noted  SKIN: No rashes or lesions appreciated      MEDICATIONS  (STANDING):  amLODIPine   Tablet 10 milliGRAM(s) Oral daily  atorvastatin 20 milliGRAM(s) Oral at bedtime  LORazepam     Tablet 1 milliGRAM(s) Oral every 6 hours  LORazepam     Tablet   Oral   multivitamin 1 Tablet(s) Oral daily  pantoprazole    Tablet 40 milliGRAM(s) Oral before breakfast  trimethoprim  160 mG/sulfamethoxazole 800 mG 1 Tablet(s) Oral two times a day    MEDICATIONS  (PRN):  acetaminophen     Tablet .. 650 milliGRAM(s) Oral every 6 hours PRN Temp greater or equal to 38C (100.4F), Mild Pain (1 - 3)  aluminum hydroxide/magnesium hydroxide/simethicone Suspension 30 milliLiter(s) Oral every 4 hours PRN Dyspepsia  melatonin 3 milliGRAM(s) Oral at bedtime PRN Insomnia  ondansetron Injectable 4 milliGRAM(s) IV Push every 8 hours PRN Nausea and/or Vomiting  traZODone 50 milliGRAM(s) Oral at bedtime PRN insomnia      Allergies    No Known Allergies    Intolerances        Vital Signs Last 24 Hrs  T(C): 36.7 (19 May 2024 11:26), Max: 37.4 (18 May 2024 20:10)  T(F): 98 (19 May 2024 11:26), Max: 99.3 (18 May 2024 20:10)  HR: 75 (19 May 2024 11:26) (67 - 90)  BP: 123/78 (19 May 2024 11:26) (114/71 - 130/72)  BP(mean): --  RR: 18 (19 May 2024 11:26) (18 - 18)  SpO2: 95% (19 May 2024 11:26) (90% - 95%)    Parameters below as of 19 May 2024 11:26  Patient On (Oxygen Delivery Method): room air        LABS:              CAPILLARY BLOOD GLUCOSE

## 2024-05-19 NOTE — CONSULT NOTE ADULT - ASSESSMENT
70 yo M with PMH of suicidal ideation with prior attempt, alcohol and benzodiazepine abuse, anxiety and depression, who presents to ED by EMS after being found unresponsive at home. Patient is a poor historian, history obtained by ED records. Patient reportedly texted wife he wanted to kill himself, and was found unresponsive at home, with an empty bottle of klonopin found next to pt. 70 yo M with PMH of suicidal ideation with prior attempt, alcohol and benzodiazepine abuse, anxiety and depression, who presents to ED by EMS after being found unresponsive at home. Patient is a poor historian, history obtained by ED records. Patient reportedly texted wife he wanted to kill himself, and was found unresponsive at home, with an empty bottle of klonopin found next to pt.    [place]  benzo use disorder  sp AMS  Suicidal ideation  ETOh use disorder  Anxiety  Depression    Ativan Taper  BENZO and ETOH detox  vitamins  labs reviewed  Psych eval  SBIRT documentation  assist with needs  nutritional assessment  SW eval  monitor mentation

## 2024-05-20 ENCOUNTER — TRANSCRIPTION ENCOUNTER (OUTPATIENT)
Age: 69
End: 2024-05-20

## 2024-05-20 VITALS
TEMPERATURE: 98 F | OXYGEN SATURATION: 96 % | HEART RATE: 107 BPM | DIASTOLIC BLOOD PRESSURE: 83 MMHG | SYSTOLIC BLOOD PRESSURE: 144 MMHG | RESPIRATION RATE: 18 BRPM

## 2024-05-20 PROCEDURE — 93005 ELECTROCARDIOGRAM TRACING: CPT

## 2024-05-20 PROCEDURE — 74177 CT ABD & PELVIS W/CONTRAST: CPT | Mod: MC

## 2024-05-20 PROCEDURE — 81001 URINALYSIS AUTO W/SCOPE: CPT

## 2024-05-20 PROCEDURE — 99231 SBSQ HOSP IP/OBS SF/LOW 25: CPT

## 2024-05-20 PROCEDURE — 96375 TX/PRO/DX INJ NEW DRUG ADDON: CPT

## 2024-05-20 PROCEDURE — 85610 PROTHROMBIN TIME: CPT

## 2024-05-20 PROCEDURE — 80307 DRUG TEST PRSMV CHEM ANLYZR: CPT

## 2024-05-20 PROCEDURE — 72125 CT NECK SPINE W/O DYE: CPT | Mod: MC

## 2024-05-20 PROCEDURE — 36415 COLL VENOUS BLD VENIPUNCTURE: CPT

## 2024-05-20 PROCEDURE — 83930 ASSAY OF BLOOD OSMOLALITY: CPT

## 2024-05-20 PROCEDURE — 70450 CT HEAD/BRAIN W/O DYE: CPT | Mod: MC

## 2024-05-20 PROCEDURE — 87077 CULTURE AEROBIC IDENTIFY: CPT

## 2024-05-20 PROCEDURE — 83735 ASSAY OF MAGNESIUM: CPT

## 2024-05-20 PROCEDURE — 80053 COMPREHEN METABOLIC PANEL: CPT

## 2024-05-20 PROCEDURE — 96367 TX/PROPH/DG ADDL SEQ IV INF: CPT

## 2024-05-20 PROCEDURE — 84100 ASSAY OF PHOSPHORUS: CPT

## 2024-05-20 PROCEDURE — 85025 COMPLETE CBC W/AUTO DIFF WBC: CPT

## 2024-05-20 PROCEDURE — 87635 SARS-COV-2 COVID-19 AMP PRB: CPT

## 2024-05-20 PROCEDURE — 73060 X-RAY EXAM OF HUMERUS: CPT

## 2024-05-20 PROCEDURE — 99285 EMERGENCY DEPT VISIT HI MDM: CPT | Mod: 25

## 2024-05-20 PROCEDURE — 99238 HOSP IP/OBS DSCHRG MGMT 30/<: CPT

## 2024-05-20 PROCEDURE — 96365 THER/PROPH/DIAG IV INF INIT: CPT

## 2024-05-20 PROCEDURE — 85730 THROMBOPLASTIN TIME PARTIAL: CPT

## 2024-05-20 PROCEDURE — 87086 URINE CULTURE/COLONY COUNT: CPT

## 2024-05-20 PROCEDURE — 96361 HYDRATE IV INFUSION ADD-ON: CPT

## 2024-05-20 PROCEDURE — 71260 CT THORAX DX C+: CPT | Mod: MC

## 2024-05-20 PROCEDURE — 85027 COMPLETE CBC AUTOMATED: CPT

## 2024-05-20 RX ORDER — CLONAZEPAM 1 MG
1 TABLET ORAL
Refills: 0 | Status: DISCONTINUED | OUTPATIENT
Start: 2024-05-20 | End: 2024-05-20

## 2024-05-20 RX ADMIN — Medication 0.5 MILLIGRAM(S): at 06:08

## 2024-05-20 RX ADMIN — PANTOPRAZOLE SODIUM 40 MILLIGRAM(S): 20 TABLET, DELAYED RELEASE ORAL at 06:08

## 2024-05-20 RX ADMIN — Medication 1 MILLIGRAM(S): at 00:16

## 2024-05-20 RX ADMIN — AMLODIPINE BESYLATE 10 MILLIGRAM(S): 2.5 TABLET ORAL at 06:07

## 2024-05-20 RX ADMIN — Medication 1 TABLET(S): at 11:10

## 2024-05-20 RX ADMIN — Medication 1 TABLET(S): at 06:08

## 2024-05-20 NOTE — DISCHARGE NOTE NURSING/CASE MANAGEMENT/SOCIAL WORK - NSDCVIVACCINE_GEN_ALL_CORE_FT
Tdap; 26-Jul-2020 05:19; Jan Inman (NADJA); [employer]; [id number] (Exp. Date: 22-Apr-2022); IntraMuscular; Deltoid Right.; 0.5 milliLiter(s); VIS (VIS Published: 09-May-2013, VIS Presented: 26-Jul-2020);

## 2024-05-20 NOTE — CAREGIVER ENGAGEMENT NOTE - CAREGIVER EDUCATION - EFFECTIVENESS
[facility]  Wound Care  Progress Note        Problem List Items Addressed This Visit          Endocrine    Diabetic ulcer of toe of right foot associated with type 2 diabetes mellitus, with necrosis of bone - Primary    Overview     S/p amputation with open wound to amputation site. HgbA1c 6.3 last check on 7/7/23.  Recent order from April was not drawn.  Needs better control for healing. Current wound measures 2.5x3x0.7 cm to right lateral foot at transmetatarsal amputation of 5th digit and wraps around side of foot.  Essentially unchanged with excess granulation centrally   Debrided with curette.  Silver nitrate applied to excess granulation around edges.    Apply alginate.  Daily changes.  FU next week.              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 Documents will be scanned.        Kori Cruz  [facility]     Verbalization

## 2024-05-20 NOTE — BH CONSULTATION LIAISON PROGRESS NOTE - NSBHMSEKNOWHOW_PSY_ALL_CORE
Current Events/Educational attainment/Vocabulary
Current Events
Current Events/Educational attainment/Vocabulary

## 2024-05-20 NOTE — DISCHARGE NOTE NURSING/CASE MANAGEMENT/SOCIAL WORK - PATIENT PORTAL LINK FT
You can access the FollowMyHealth Patient Portal offered by [facility] by registering at the following website: [email or web address]. By joining [employer]’s FollowMyHealth portal, you will also be able to view your health information using other applications (apps) compatible with our system.

## 2024-05-20 NOTE — PROGRESS NOTE ADULT - ASSESSMENT
70 yo M with PMH of suicidal ideation with prior attempt, alcohol and benzodiazepine abuse, anxiety and depression, who presents to ED by EMS after being found unresponsive at home. Patient is a poor historian, history obtained by ED records. Patient reportedly texted wife he wanted to kill himself, and was found unresponsive at home, with an empty bottle of klonopin found next to pt.    [place]  benzo use disorder  sp AMS  Suicidal ideation  ETOh use disorder  Anxiety  Depression    on ABX  Psych eval noted  vs noted    Ativan Taper  BENZO and ETOH detox  vitamins  labs reviewed  Psych eval  SBIRT documentation  assist with needs  nutritional assessment  SW eval  monitor mentation

## 2024-05-20 NOTE — BH CONSULTATION LIAISON PROGRESS NOTE - NSBHCONSULTRECOMMENDOTHER_PSY_A_CORE FT
1. Consider the use of an antidepressant.  2. Patient will benefit from outpatient therapy.  3. Patient will follow-up with the outpatient provider for pharmacotherapy.

## 2024-05-20 NOTE — CAREGIVER ENGAGEMENT NOTE - CAREGIVER EDUCATION - TYPES DISCUSSED
Behavioral Health Services/Discharge plan
Behavioral Health Services/Discharge plan/Transportation coordination

## 2024-05-20 NOTE — BH CONSULTATION LIAISON PROGRESS NOTE - NSBHFUPINTERVALHXFT_PSY_A_CORE
Patient seen, evaluated and chart reviewed. Patient is about to complete his detox protocol, no withdrawal symptoms. At this time he wants to get out of the hospital and his wife agrees with that plan. "He needs to get out of here, he is so much better." At this time patient is medically cleared. He reports that his act was intended to get his wife's attention as he was embarrassed about his relapse. Currently denies symptoms of psychosis, elvira or acute depression.

## 2024-05-20 NOTE — PROGRESS NOTE ADULT - SUBJECTIVE AND OBJECTIVE BOX
Date/Time Patient Seen:  		  Referring MD:   Data Reviewed	       Patient is a 69y old  Male who presents with a chief complaint of     Subjective/HPI     PAST MEDICAL & SURGICAL HISTORY:  Hypertension    Anxiety    Alcohol abuse    Depression    Suicidal behavior    Benzodiazepine abuse    Hernia, inguinal    H/O neck surgery          Medication list         MEDICATIONS  (STANDING):  amLODIPine   Tablet 10 milliGRAM(s) Oral daily  atorvastatin 20 milliGRAM(s) Oral at bedtime  LORazepam     Tablet   Oral   LORazepam     Tablet 0.5 milliGRAM(s) Oral every 6 hours  multivitamin 1 Tablet(s) Oral daily  pantoprazole    Tablet 40 milliGRAM(s) Oral before breakfast  trimethoprim  160 mG/sulfamethoxazole 800 mG 1 Tablet(s) Oral two times a day    MEDICATIONS  (PRN):  acetaminophen     Tablet .. 650 milliGRAM(s) Oral every 6 hours PRN Temp greater or equal to 38C (100.4F), Mild Pain (1 - 3)  aluminum hydroxide/magnesium hydroxide/simethicone Suspension 30 milliLiter(s) Oral every 4 hours PRN Dyspepsia  diphenhydrAMINE Injectable 50 milliGRAM(s) IntraMuscular every 6 hours PRN Combative behavior  haloperidol    Injectable 5 milliGRAM(s) IntraMuscular every 6 hours PRN Agitation  LORazepam   Injectable 2 milliGRAM(s) IntraMuscular every 6 hours PRN Combative behavior  melatonin 3 milliGRAM(s) Oral at bedtime PRN Insomnia  ondansetron Injectable 4 milliGRAM(s) IV Push every 8 hours PRN Nausea and/or Vomiting  traZODone 50 milliGRAM(s) Oral at bedtime PRN insomnia         Vitals log        ICU Vital Signs Last 24 Hrs  T(C): 36.8 (19 May 2024 21:50), Max: 37 (19 May 2024 17:13)  T(F): 98.3 (19 May 2024 21:50), Max: 98.6 (19 May 2024 17:13)  HR: 85 (20 May 2024 00:10) (75 - 102)  BP: 117/78 (20 May 2024 00:10) (117/78 - 139/84)  BP(mean): --  ABP: --  ABP(mean): --  RR: 18 (20 May 2024 00:10) (17 - 18)  SpO2: 95% (20 May 2024 00:10) (93% - 97%)    O2 Parameters below as of 20 May 2024 00:10  Patient On (Oxygen Delivery Method): room air                 Input and Output:  I&O's Detail      Lab Data                  Review of Systems	      Objective     Physical Examination    heart s1s2  lung dc BS  head nc      Pertinent Lab findings & Imaging      [place]al[place]:  NO   Adequate UO     I&O's Detail           Discussed with:     Cultures:	        Radiology

## 2024-05-20 NOTE — SOCIAL WORK PROGRESS NOTE - NSSWPROGRESSNOTE_GEN_ALL_CORE
Per discussion w/ inpatient interdisciplinary treatment team this AM, including hospital psychiatrist & attending MD, patient has been medically & psychiatrically cleared for transition to next level of care today, 05/20/2024, and no longer requires inpatient mental health treatment placement for discharge.  spoke w/ patient's wife, Mrs. Liane Carlisle, @ [phone] who confirmed to be understanding of and agreeable to patient returning home, identifying that she has already spoken to his private psychiatrist about his ongoing outpatient treatment plan so that he can follow-up accordingly. Per wife, patient's brother-in-law will pick him up for discharge transportation.  to remain available for any changes or updates.

## 2024-05-20 NOTE — DISCHARGE NOTE PROVIDER - CARE PROVIDER_API CALL
Jeremy Joseph [facility]  Internal Medicine  [address] [place] [address]  Phone: [phone]  Fax: [phone]  Established Patient  Follow Up Time: Routine

## 2024-05-20 NOTE — CAREGIVER ENGAGEMENT NOTE - CAREGIVER EDUCATION NOTES - FREE TEXT
Per discussion w/ inpatient interdisciplinary treatment team this AM, including hospital psychiatrist & attending MD, patient has been medically & psychiatrically cleared for transition to next level of care today, 05/20/2024, and no longer requires inpatient mental health treatment placement for discharge.  spoke w/ patient's wife, Mrs. Liane Carlisle, @ [phone] who confirmed to be understanding of and agreeable to patient returning home, identifying that she has already spoken to his private psychiatrist about his ongoing outpatient treatment plan so that he can follow-up accordingly. Per wife, patient's brother-in-law will pick him up for discharge transportation.  to remain available for any changes or updates.

## 2024-05-20 NOTE — BH CONSULTATION LIAISON PROGRESS NOTE - CURRENT MEDICATION
MEDICATIONS  (STANDING):  amLODIPine   Tablet 10 milliGRAM(s) Oral daily  atorvastatin 20 milliGRAM(s) Oral at bedtime  LORazepam     Tablet   Oral   LORazepam     Tablet 1 milliGRAM(s) Oral every 6 hours  LORazepam   Injectable 2 milliGRAM(s) IV Push once  multivitamin 1 Tablet(s) Oral daily  pantoprazole    Tablet 40 milliGRAM(s) Oral before breakfast  trimethoprim  160 mG/sulfamethoxazole 800 mG 1 Tablet(s) Oral two times a day    MEDICATIONS  (PRN):  acetaminophen     Tablet .. 650 milliGRAM(s) Oral every 6 hours PRN Temp greater or equal to 38C (100.4F), Mild Pain (1 - 3)  aluminum hydroxide/magnesium hydroxide/simethicone Suspension 30 milliLiter(s) Oral every 4 hours PRN Dyspepsia  diphenhydrAMINE Injectable 50 milliGRAM(s) IntraMuscular every 6 hours PRN Combative behavior  haloperidol    Injectable 5 milliGRAM(s) IntraMuscular every 6 hours PRN Agitation  LORazepam   Injectable 2 milliGRAM(s) IntraMuscular every 6 hours PRN Combative behavior  melatonin 3 milliGRAM(s) Oral at bedtime PRN Insomnia  ondansetron Injectable 4 milliGRAM(s) IV Push every 8 hours PRN Nausea and/or Vomiting  traZODone 50 milliGRAM(s) Oral at bedtime PRN insomnia  
MEDICATIONS  (STANDING):  amLODIPine   Tablet 10 milliGRAM(s) Oral daily  atorvastatin 20 milliGRAM(s) Oral at bedtime  multivitamin 1 Tablet(s) Oral daily  pantoprazole    Tablet 40 milliGRAM(s) Oral before breakfast  trimethoprim  160 mG/sulfamethoxazole 800 mG 1 Tablet(s) Oral two times a day    MEDICATIONS  (PRN):  acetaminophen     Tablet .. 650 milliGRAM(s) Oral every 6 hours PRN Temp greater or equal to 38C (100.4F), Mild Pain (1 - 3)  aluminum hydroxide/magnesium hydroxide/simethicone Suspension 30 milliLiter(s) Oral every 4 hours PRN Dyspepsia  melatonin 3 milliGRAM(s) Oral at bedtime PRN Insomnia  ondansetron Injectable 4 milliGRAM(s) IV Push every 8 hours PRN Nausea and/or Vomiting  
MEDICATIONS  (STANDING):  amLODIPine   Tablet 10 milliGRAM(s) Oral daily  atorvastatin 20 milliGRAM(s) Oral at bedtime  multivitamin 1 Tablet(s) Oral daily  pantoprazole    Tablet 40 milliGRAM(s) Oral before breakfast  trimethoprim  160 mG/sulfamethoxazole 800 mG 1 Tablet(s) Oral two times a day    MEDICATIONS  (PRN):  acetaminophen     Tablet .. 650 milliGRAM(s) Oral every 6 hours PRN Temp greater or equal to 38C (100.4F), Mild Pain (1 - 3)  aluminum hydroxide/magnesium hydroxide/simethicone Suspension 30 milliLiter(s) Oral every 4 hours PRN Dyspepsia  diphenhydrAMINE Injectable 50 milliGRAM(s) IntraMuscular every 6 hours PRN Combative behavior  haloperidol    Injectable 5 milliGRAM(s) IntraMuscular every 6 hours PRN Agitation  LORazepam   Injectable 2 milliGRAM(s) IntraMuscular every 6 hours PRN Combative behavior  melatonin 3 milliGRAM(s) Oral at bedtime PRN Insomnia  ondansetron Injectable 4 milliGRAM(s) IV Push every 8 hours PRN Nausea and/or Vomiting  traZODone 50 milliGRAM(s) Oral at bedtime PRN insomnia

## 2024-05-20 NOTE — BH CONSULTATION LIAISON PROGRESS NOTE - NSBHASSESSMENTFT_PSY_ALL_CORE
Mr Carlisle is a 69 year old man with a history of Panic disorder ,  Social anxiety and Alcohol misuse , who was admitted to the medical floor for altered mental status . According to the medical team, patient was said to have been found unresponsive by his wife with an empty bottle of vodka an empty bottle of klonopin,. According to the medical team, patient has since been medically cleared and a 2PC has since been filled .   Patient appears to have been having very distressing anxiety symptoms and panic attacks coupled with ongoing feelings of unwell likely secondary to the UTI , causing him to become intoxicated with alcohol on the day of presentation. it does not appear that patient overdosed on Klonopin since the mediations were reportedly in the possession of his wife . In addition, it does not appears that patient is a chronic daily drinker of alcohol and is at low risk for alcohol withdrawal symptoms . It however seems that patient self medicated his symptoms of anxiety by becoming intoxicated with alcohol. At this point it appears that the patient is no longer acute risk to himself and there is no need for psychiatric involuntary hospitalization.

## 2024-05-20 NOTE — DISCHARGE NOTE PROVIDER - HOSPITAL COURSE
Patient is a 70 yo M with PMH of suicidal ideation with prior attempt, alcohol and benzodiazepine abuse, anxiety and depression, who presents to ED by EMS after being found unresponsive at home. Patient is a poor historian, history obtained by ED records. Patient reportedly texted wife he wanted to kill himself, and was found unresponsive at home, with an empty bottle of klonopin found next to pt. He had reportedly been drinking alcohol that evening. He also reported a fall. Wife reports a swiss army knife was found next to patient at the house.      ED course: pt lethargic on arrival. He reportedly was only able to answer name and that he drank alcohol. CMP K 3.3otherwise unremarkable. INR 0.96, CBC unremarkable. UA shows positive nitriete, small LE, 20 WBC and moderate bacteria. APAP and aspirin levels neg, serum alcohol 200, urine drug screen presumed negative. ED noted cuts to wrists. On re-examination in morning patient was more rouasable and able to give medical history, says he always tells wife he is suicidal when drinking, doesn't remember taking pills. Patient developed low-grade fever. Psych recommends admission to medicine for medical clearance and then plan for transfer to inpatient psych when medically ready.       Intentional overdose  -as per ED patient was found with an empty bottle of clonazepam. Wife disputes this.  -serum alcohol 200 on arrival.   -Was placed on 1:1 observation  -psych consulted by ED and following, recommends outpatient discharge    Benzodiazepine withdrawal  - pt reports taking TID clonazepam at home  - Patient reports he does not drink daily and is a social drinker for anxiety at events  - CIWA monitoring unremarkable  - was on ativan taper while admitted, resume home clonazepam  - addiction medicine consulted    Complicated UTI  -low-grade fevers on arrival  -no leukocytosis  -UA shows positive nitrites, small LE, moderate bacteria present.   -started on BactrimDS, 800mg PO BID, completed 3 days of antibiotic therapy  -urine cx Staph epidermidis, discontinue antibiotic  -Tylenol PRN  -will monitor     HTN  -continue home amlodipine    GERD  -continue home pantoprazole    HLD  -continue home atorvastatin

## 2024-05-20 NOTE — DISCHARGE NOTE NURSING/CASE MANAGEMENT/SOCIAL WORK - NSDCPEFALRISK_GEN_ALL_CORE
For information on Fall & Injury Prevention, visit: [email or web address] OR  [email or web address] OR  https://www.cdc.gov/steadi/patient.html

## 2024-05-20 NOTE — DISCHARGE NOTE PROVIDER - ATTENDING DISCHARGE PHYSICAL EXAMINATION:
GENERAL: NAD, well-groomed, well-developed  HEAD:  Atraumatic, Normocephalic  EYES: EOMI, PERRLA, conjunctiva and sclera clear  ENMT: Moist mucous membranes, Good dentition, No lesions  NECK: Supple, No JVD appreciated  NERVOUS SYSTEM:  Alert & Oriented X3, Good concentration; All 4 extremities mobile, no gross sensory deficits.   CHEST/LUNG: Clear to auscultation bilaterally; No rales, rhonchi, wheezing, or rubs appreciated  HEART: Regular rate and rhythm; No murmurs, rubs, or gallops  ABDOMEN: Soft, Nontender, Nondistended  EXTREMITIES:  No clubbing, cyanosis, or edema appreciated  LYMPH: No lymphadenopathy noted  SKIN: No rashes or lesions appreciated  PSYCH: appropriate mood and affect

## 2024-05-20 NOTE — BH CONSULTATION LIAISON PROGRESS NOTE - NSBHATTESTBILLING_PSY_A_CORE
55482-Ijixzvkyxp OBS or IP - low complexity OR 25-34 mins
35052-Nhgoyramtc OBS or IP - moderate complexity OR 35-49 mins
20120-Demglidjve OBS or IP - low complexity OR 25-34 mins

## 2024-05-20 NOTE — DISCHARGE NOTE PROVIDER - NSDCMRMEDTOKEN_GEN_ALL_CORE_FT
KlonoPIN 0.5 mg oral tablet: 1 milligram(s) orally 3 times a day, As Needed  Lipitor: 20 milligram(s) orally once a day (at bedtime)  Multiple Vitamins oral tablet: 1 tab(s) orally once a day  Norvasc 10 mg oral tablet: 1 tab(s) orally once a day  pantoprazole 40 mg oral delayed release tablet: 1 tab(s) orally once a day  Vitamin D3:

## 2024-05-20 NOTE — BH CONSULTATION LIAISON PROGRESS NOTE - NSBHFUPINTERVALCCFT_PSY_A_CORE
I guess I did not know that I had UTI.
" I did not overdose on Klonopin, I took what I normally take every day, 2 tablets " 
" I need to get out  here. I feel better."

## 2024-05-20 NOTE — BH CONSULTATION LIAISON PROGRESS NOTE - NSBHCHARTREVIEWVS_PSY_A_CORE FT
Vital Signs Last 24 Hrs  T(C): 37 (19 May 2024 17:13), Max: 37.2 (19 May 2024 00:16)  T(F): 98.6 (19 May 2024 17:13), Max: 98.9 (19 May 2024 00:16)  HR: 93 (19 May 2024 17:13) (67 - 93)  BP: 139/84 (19 May 2024 17:13) (117/73 - 139/84)  BP(mean): --  RR: 18 (19 May 2024 17:13) (17 - 18)  SpO2: 93% (19 May 2024 17:13) (93% - 95%)    Parameters below as of 19 May 2024 17:13  Patient On (Oxygen Delivery Method): room air    
Vital Signs Last 24 Hrs  T(C): 36.6 (20 May 2024 09:17), Max: 37 (19 May 2024 17:13)  T(F): 97.9 (20 May 2024 09:17), Max: 98.6 (19 May 2024 17:13)  HR: 83 (20 May 2024 09:17) (73 - 102)  BP: 126/76 (20 May 2024 09:17) (117/78 - 139/84)  BP(mean): --  RR: 18 (20 May 2024 09:17) (17 - 18)  SpO2: 96% (20 May 2024 09:17) (93% - 97%)    Parameters below as of 20 May 2024 09:17  Patient On (Oxygen Delivery Method): room air    
Vital Signs Last 24 Hrs  T(C): 37.1 (17 May 2024 13:14), Max: 37.2 (16 May 2024 17:13)  T(F): 98.7 (17 May 2024 13:14), Max: 98.9 (16 May 2024 17:13)  HR: 79 (17 May 2024 13:14) (67 - 82)  BP: 117/68 (17 May 2024 13:14) (117/68 - 150/78)  BP(mean): --  RR: 19 (17 May 2024 13:14) (17 - 19)  SpO2: 92% (17 May 2024 13:14) (90% - 96%)    Parameters below as of 17 May 2024 04:42  Patient On (Oxygen Delivery Method): room air

## 2024-05-20 NOTE — BH CONSULTATION LIAISON PROGRESS NOTE - NSBHCONSULTMEDANXIETY_PSY_A_CORE FT
We recommend Atarax 50mg p.o Q 6 hours PRN for anxiety and insomnia 
We recommend Atarax 50mg p.o Q 6 hours PRN for anxiety and insomnia

## 2024-05-20 NOTE — BH CONSULTATION LIAISON PROGRESS NOTE - NSICDXBHSECONDARYDX_PSY_ALL_CORE
Closure 2 Information: This tab is for additional flaps and grafts, including complex repair and grafts and complex repair and flaps. You can also specify a different location for the additional defect, if the location is the same you do not need to select a new one. We will insert the automated text for the repair you select below just as we do for solitary flaps and grafts. Please note that at this time if you select a location with a different insurance zone you will need to override the ICD10 and CPT if appropriate. Social anxiety disorder   F40.10  Alcohol use with uncomplicated intoxication without use disorder   F10.920  Panic disorder without agoraphobia with severe panic attacks   F41.0

## 2024-06-18 ENCOUNTER — APPOINTMENT (OUTPATIENT)
Dept: OPHTHALMOLOGY | Facility: CLINIC | Age: 69
End: 2024-06-18
Payer: COMMERCIAL

## 2024-06-18 ENCOUNTER — NON-APPOINTMENT (OUTPATIENT)
Age: 69
End: 2024-06-18

## 2024-06-18 PROCEDURE — 92083 EXTENDED VISUAL FIELD XM: CPT

## 2024-06-18 PROCEDURE — 92133 CPTRZD OPH DX IMG PST SGM ON: CPT

## 2024-06-18 PROCEDURE — 92012 INTRM OPH EXAM EST PATIENT: CPT

## 2024-09-23 NOTE — BH CONSULTATION LIAISON ASSESSMENT NOTE - NSBHPSYCHHX_PSY_A_CORE
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 Despite taking naproxen and muscle relaxant it is a sharp 8/10 pain  He notices numbness in right index finger as well yes...

## 2025-01-21 ENCOUNTER — APPOINTMENT (OUTPATIENT)
Dept: OPHTHALMOLOGY | Facility: CLINIC | Age: 70
End: 2025-01-21

## 2025-01-27 NOTE — ED PROCEDURE NOTE - NS ED PROCEDURE ASSISTED BY
The procedure was performed independently

## 2025-06-18 NOTE — H&P ADULT - NSHPLANGLIMITEDENGLISH_GEN_A_CORE
Vascular Access Team    Evaluation for: Bedside Midline placement  Requested by name: Jennifer Mercer  Date/Time: 6/18@11:42    Indication: daptomycin x1 week  Allergy to CHG or Heparin or Lidocaine: NO    Anticoagulants/ antiplatelets: asa 81mg, lovenox 40mg    Platelets(>20): 185  INR(<3): 0.90  eGFR(>40): 97  Blood cultures sent: n/a    Arm restrictions: no    Consent obtained: n/a    Comments: Bedside midline order evaluated. Please obtain, call [phone] for the VAT RN with any questions.   No